# Patient Record
Sex: FEMALE | Race: BLACK OR AFRICAN AMERICAN | NOT HISPANIC OR LATINO | Employment: FULL TIME | URBAN - METROPOLITAN AREA
[De-identification: names, ages, dates, MRNs, and addresses within clinical notes are randomized per-mention and may not be internally consistent; named-entity substitution may affect disease eponyms.]

---

## 2021-10-06 LAB
ABO (EXTERNAL): NORMAL
HEMOGLOBIN (EXTERNAL): 13.4 G/DL (ref 11–16)
HEPATITIS B SURFACE ANTIGEN (EXTERNAL): NEGATIVE
HIV1+2 AB SERPL QL IA: NEGATIVE
PLATELET COUNT (EXTERNAL): 366 10E3/UL (ref 150–400)
RH (EXTERNAL): POSITIVE
RUBELLA ANTIBODY IGG (EXTERNAL): NORMAL
VDRL (SYPHILIS) (EXTERNAL): NONREACTIVE

## 2022-01-27 LAB
HEMOGLOBIN (EXTERNAL): 12.5 G/DL (ref 11–15)
PLATELET COUNT (EXTERNAL): 332 10E3/UL (ref 150–450)

## 2022-03-28 LAB — GROUP B STREPTOCOCCUS (EXTERNAL): NEGATIVE

## 2022-04-04 ENCOUNTER — HOSPITAL ENCOUNTER (OUTPATIENT)
Facility: CLINIC | Age: 27
Discharge: HOME | End: 2022-04-04
Attending: OBSTETRICS & GYNECOLOGY | Admitting: OBSTETRICS & GYNECOLOGY
Payer: COMMERCIAL

## 2022-04-04 VITALS
SYSTOLIC BLOOD PRESSURE: 139 MMHG | HEART RATE: 90 BPM | DIASTOLIC BLOOD PRESSURE: 87 MMHG | TEMPERATURE: 98.1 F | RESPIRATION RATE: 16 BRPM | OXYGEN SATURATION: 100 %

## 2022-04-04 PROBLEM — Z36.89 ENCOUNTER FOR TRIAGE IN PREGNANT PATIENT: Status: ACTIVE | Noted: 2022-04-04

## 2022-04-04 LAB
ABO/RH(D): NORMAL
ALBUMIN MFR UR ELPH: <7 MG/DL
ALT SERPL W P-5'-P-CCNC: <9 U/L (ref 0–45)
ANTIBODY SCREEN: NEGATIVE
AST SERPL W P-5'-P-CCNC: 11 U/L (ref 0–40)
CREAT SERPL-MCNC: 0.57 MG/DL (ref 0.6–1.1)
CREAT UR-MCNC: 42 MG/DL
ERYTHROCYTE [DISTWIDTH] IN BLOOD BY AUTOMATED COUNT: 14 % (ref 10–15)
GFR SERPL CREATININE-BSD FRML MDRD: >90 ML/MIN/1.73M2
HCT VFR BLD AUTO: 34.8 % (ref 35–47)
HGB BLD-MCNC: 11.4 G/DL (ref 11.7–15.7)
MCH RBC QN AUTO: 28.1 PG (ref 26.5–33)
MCHC RBC AUTO-ENTMCNC: 32.8 G/DL (ref 31.5–36.5)
MCV RBC AUTO: 86 FL (ref 78–100)
PLATELET # BLD AUTO: 323 10E3/UL (ref 150–450)
PROT/CREAT 24H UR: NORMAL MG/G{CREAT}
RBC # BLD AUTO: 4.05 10E6/UL (ref 3.8–5.2)
SPECIMEN EXPIRATION DATE: NORMAL
WBC # BLD AUTO: 6.5 10E3/UL (ref 4–11)

## 2022-04-04 PROCEDURE — G0463 HOSPITAL OUTPT CLINIC VISIT: HCPCS

## 2022-04-04 PROCEDURE — 84450 TRANSFERASE (AST) (SGOT): CPT | Performed by: OBSTETRICS & GYNECOLOGY

## 2022-04-04 PROCEDURE — 85027 COMPLETE CBC AUTOMATED: CPT | Performed by: OBSTETRICS & GYNECOLOGY

## 2022-04-04 PROCEDURE — 84460 ALANINE AMINO (ALT) (SGPT): CPT | Performed by: OBSTETRICS & GYNECOLOGY

## 2022-04-04 PROCEDURE — 84156 ASSAY OF PROTEIN URINE: CPT | Performed by: OBSTETRICS & GYNECOLOGY

## 2022-04-04 PROCEDURE — 86901 BLOOD TYPING SEROLOGIC RH(D): CPT | Performed by: OBSTETRICS & GYNECOLOGY

## 2022-04-04 PROCEDURE — 82565 ASSAY OF CREATININE: CPT | Performed by: OBSTETRICS & GYNECOLOGY

## 2022-04-04 PROCEDURE — 36415 COLL VENOUS BLD VENIPUNCTURE: CPT | Performed by: OBSTETRICS & GYNECOLOGY

## 2022-04-04 RX ORDER — OMEPRAZOLE 10 MG/1
20 CAPSULE, DELAYED RELEASE ORAL DAILY PRN
COMMUNITY

## 2022-04-04 RX ORDER — MAGNESIUM SULFATE 4 G/50ML
4 INJECTION INTRAVENOUS
Status: DISCONTINUED | OUTPATIENT
Start: 2022-04-04 | End: 2022-04-04 | Stop reason: HOSPADM

## 2022-04-04 RX ORDER — MAGNESIUM SULFATE HEPTAHYDRATE 40 MG/ML
2 INJECTION, SOLUTION INTRAVENOUS
Status: DISCONTINUED | OUTPATIENT
Start: 2022-04-04 | End: 2022-04-04 | Stop reason: HOSPADM

## 2022-04-04 RX ORDER — SODIUM CHLORIDE, SODIUM LACTATE, POTASSIUM CHLORIDE, CALCIUM CHLORIDE 600; 310; 30; 20 MG/100ML; MG/100ML; MG/100ML; MG/100ML
10-125 INJECTION, SOLUTION INTRAVENOUS CONTINUOUS
Status: DISCONTINUED | OUTPATIENT
Start: 2022-04-04 | End: 2022-04-04 | Stop reason: HOSPADM

## 2022-04-04 RX ORDER — ONDANSETRON 4 MG/1
4 TABLET, FILM COATED ORAL EVERY 8 HOURS PRN
COMMUNITY

## 2022-04-04 RX ORDER — LABETALOL HYDROCHLORIDE 5 MG/ML
20-80 INJECTION, SOLUTION INTRAVENOUS EVERY 10 MIN PRN
Status: DISCONTINUED | OUTPATIENT
Start: 2022-04-04 | End: 2022-04-04 | Stop reason: HOSPADM

## 2022-04-04 RX ORDER — MAGNESIUM SULFATE HEPTAHYDRATE 500 MG/ML
10 INJECTION, SOLUTION INTRAMUSCULAR; INTRAVENOUS
Status: DISCONTINUED | OUTPATIENT
Start: 2022-04-04 | End: 2022-04-04 | Stop reason: HOSPADM

## 2022-04-04 RX ORDER — PRENATAL VIT/IRON FUM/FOLIC AC 27MG-0.8MG
1 TABLET ORAL DAILY
COMMUNITY

## 2022-04-04 RX ORDER — LORAZEPAM 2 MG/ML
2 INJECTION INTRAMUSCULAR
Status: DISCONTINUED | OUTPATIENT
Start: 2022-04-04 | End: 2022-04-04 | Stop reason: HOSPADM

## 2022-04-04 RX ORDER — LIDOCAINE 40 MG/G
CREAM TOPICAL
Status: DISCONTINUED | OUTPATIENT
Start: 2022-04-04 | End: 2022-04-04 | Stop reason: HOSPADM

## 2022-04-04 RX ORDER — ERGOCALCIFEROL (VITAMIN D2) 10 MCG
TABLET ORAL
COMMUNITY

## 2022-04-04 RX ORDER — HYDRALAZINE HYDROCHLORIDE 20 MG/ML
10 INJECTION INTRAMUSCULAR; INTRAVENOUS
Status: DISCONTINUED | OUTPATIENT
Start: 2022-04-04 | End: 2022-04-04 | Stop reason: HOSPADM

## 2022-04-04 NOTE — PLAN OF CARE
Category I tracing. Mild contractions picked up by TOCO, pt states she doesn't feel them. HELLP labs WNL. Pt to be discharged home per provider's orders. Pt instructed to follow up in clinic on Wednesday or Thursday this week. Educated on signs/sxs on pre-eclampsia.

## 2022-04-04 NOTE — DISCHARGE INSTRUCTIONS
Understanding Preeclampsia  Preeclampsia is high blood pressure (hypertension) that happens during pregnancy. It often shows up around the 20th week of pregnancy. It often goes back to normal by the 12th week after you give birth. It can lead to serious health risks for you and your baby. During your pregnancy, your healthcare provider will watch your blood pressure.      Your blood pressure will be monitored regularly throughout your pregnancy to help check for preeclampsia.   Symptoms  A common symptom of preeclampsia is high blood pressure. Other symptoms may include:     Rapid weight gain    Protein in your urine    Headache    Belly (abdominal) pain on your right side    Vision problems. These include flashes or spots.    Swelling (edema) in your face or hands. This also often happens near the end of normal pregnancies, even without preeclampsia.  Tests you may have  Your healthcare provider will want to check your blood pressure throughout your pregnancy. If your blood pressure is high, you may have the following tests:     Urine tests to look for protein    Blood tests to confirm preeclampsia    Fetal monitoring to make sure that your baby is healthy  Treating preeclampsia  You may need to take a daily low dose of aspirin if you are at risk for preeclampsia. Preeclampsia almost always ends soon after you give birth. Until then, your healthcare provider can help manage your condition. If your symptoms are mild, you may need activity limits at home, including bed rest and no heavy lifting. If your symptoms are severe, you will stay in the hospital. Hospital treatment includes:     Activity limits to help control blood pressure. This means no heavy lifting and 8 hours per day lying down with the feet up.    Magnesium IV (intravenous) drip during labor to prevent seizures    Induced labor or surgical delivery by  section. Delivery is considered the cure for preeclampsia.  When to call your healthcare  provider  Call your healthcare provider if swelling, weight gain, or other symptoms come on quickly or are severe. Some cases of preeclampsia are more severe than others. Your symptoms also may change or get worse as you get closer to your due date.   Who s at risk?  No one knows what causes preeclampsia. Preeclampsia can happen in any pregnant woman. But it is more common in first-time pregnancies. Things that increase the risk include:     Previous pregnancies. You are at risk if you had preeclampsia, intrauterine growth retardation (IUGR),  birth, placental abruption, or fetal death in a past pregnancy.    Health history of mother. You are at risk if you have diabetes, high blood pressure, obesity, kidney disease, autoimmune disease such as lupus, or a family history of preeclampsia.    Current pregnancy. You are at risk if this is your first pregnancy, or if you have multiple fetuses, are younger than age 18 or older than 40, or used in vitro  fertilization.    Race. You are at risk if you are black.  Dangers of preeclampsia  If not treated, preeclampsia can cause problems for you and your baby. The placenta is the organ that nourishes your baby. It may tear away from the uterine wall. This can put the baby at risk for health problems (fetal distress) and premature birth. Preeclampsia can also cause these health problems:     Kidney failure or other organ damage    Seizures    Stroke  Once you give birth  In most cases, preeclampsia goes away on its own soon after you give birth. This is often by the 12th week after you deliver. Within days of delivery, your blood pressure, swelling, and other symptoms should get better. For some women, problems from preeclampsia can continue after delivery.   Postpartum preeclampsia that develops within the first 48 hours after delivery is rare. Another type of postpartum preeclampsia that develops more than 48 hours after delivery is called late-onset preeclampsia. It  is also rare. Contact your healthcare provider right away if you have symptoms of preeclampsia after you deliver.   [employer] last reviewed this educational content on 12/1/2019 2000-2021 [employer]. All rights reserved. This information is not intended as a substitute for professional medical care. Always follow your healthcare professional's instructions.

## 2022-04-04 NOTE — PLAN OF CARE
Pt ambulated to room 266 for triage. Sent in from clinic with elevated blood pressures. MD notified of patients arrival.

## 2022-04-10 ENCOUNTER — HOSPITAL ENCOUNTER (OUTPATIENT)
Facility: CLINIC | Age: 27
Discharge: HOME | End: 2022-04-10
Attending: OBSTETRICS & GYNECOLOGY | Admitting: OBSTETRICS & GYNECOLOGY
Payer: COMMERCIAL

## 2022-04-10 VITALS — SYSTOLIC BLOOD PRESSURE: 141 MMHG | DIASTOLIC BLOOD PRESSURE: 90 MMHG

## 2022-04-10 LAB
ABO/RH(D): NORMAL
ALBUMIN MFR UR ELPH: <7 MG/DL
ALT SERPL W P-5'-P-CCNC: <9 U/L (ref 0–45)
ANTIBODY SCREEN: NEGATIVE
AST SERPL W P-5'-P-CCNC: 16 U/L (ref 0–40)
CREAT UR-MCNC: 10 MG/DL
ERYTHROCYTE [DISTWIDTH] IN BLOOD BY AUTOMATED COUNT: 14 % (ref 10–15)
HCT VFR BLD AUTO: 32.6 % (ref 35–47)
HGB BLD-MCNC: 10.9 G/DL (ref 11.7–15.7)
MCH RBC QN AUTO: 27.6 PG (ref 26.5–33)
MCHC RBC AUTO-ENTMCNC: 33.4 G/DL (ref 31.5–36.5)
MCV RBC AUTO: 83 FL (ref 78–100)
PLATELET # BLD AUTO: 326 10E3/UL (ref 150–450)
PROT/CREAT 24H UR: NORMAL MG/G{CREAT}
RBC # BLD AUTO: 3.95 10E6/UL (ref 3.8–5.2)
SPECIMEN EXPIRATION DATE: NORMAL
WBC # BLD AUTO: 8.7 10E3/UL (ref 4–11)

## 2022-04-10 PROCEDURE — 84156 ASSAY OF PROTEIN URINE: CPT | Performed by: OBSTETRICS & GYNECOLOGY

## 2022-04-10 PROCEDURE — 84460 ALANINE AMINO (ALT) (SGPT): CPT | Performed by: OBSTETRICS & GYNECOLOGY

## 2022-04-10 PROCEDURE — 84450 TRANSFERASE (AST) (SGOT): CPT | Performed by: OBSTETRICS & GYNECOLOGY

## 2022-04-10 PROCEDURE — 85027 COMPLETE CBC AUTOMATED: CPT | Performed by: OBSTETRICS & GYNECOLOGY

## 2022-04-10 PROCEDURE — G0463 HOSPITAL OUTPT CLINIC VISIT: HCPCS

## 2022-04-10 PROCEDURE — 36415 COLL VENOUS BLD VENIPUNCTURE: CPT | Performed by: OBSTETRICS & GYNECOLOGY

## 2022-04-10 PROCEDURE — 86901 BLOOD TYPING SEROLOGIC RH(D): CPT | Performed by: OBSTETRICS & GYNECOLOGY

## 2022-04-10 RX ORDER — LIDOCAINE 40 MG/G
CREAM TOPICAL
Status: DISCONTINUED | OUTPATIENT
Start: 2022-04-10 | End: 2022-04-10 | Stop reason: HOSPADM

## 2022-04-10 NOTE — PROGRESS NOTES
Outpatient OB assessment.   The chart, vital signs and ab results were reviewed.   Patient was seen to r/o Pre-eclampsia due to a complaint of a headache. Her labs are normal. Seven does have a right sided toothache with radiation of the pain to the right temple. She has not been able to rest well.   She was given the contact information to an emergency dental clinic in [place] to be assessed today.   Her NST was reactive. She is not in active labor.  She will continue her routine obstetrical care.   Discharge from triage.

## 2022-04-10 NOTE — PROGRESS NOTES
Dr Cheek at bedside. Pt instructed to go to an emergency dental clinic today to tooth/pain assessed. Pt agreeable to plan.

## 2022-04-10 NOTE — DISCHARGE INSTRUCTIONS
Make same day appointment with dental clinic.    Keep follow up appointments as scheduled in clinic.     Call clinic with questions or concerns.

## 2022-04-10 NOTE — PROGRESS NOTES
Pt to [facility] with complaints of intense tooth pain and a headache. Pt states the right side of her mouth hurts from what she believes is an infected tooth. Pt states she also has a chronic headache on the right temporal area. Pt denies any visual changes, RUQ pain, nausea/vomitting. Plan to draw HELLP labs and serial blood pressures.

## 2022-04-14 ENCOUNTER — HOSPITAL ENCOUNTER (INPATIENT)
Facility: CLINIC | Age: 27
LOS: 2 days | Discharge: HOME | End: 2022-04-16
Attending: OBSTETRICS & GYNECOLOGY | Admitting: OBSTETRICS & GYNECOLOGY
Payer: COMMERCIAL

## 2022-04-14 DIAGNOSIS — O13.3 GESTATIONAL HYPERTENSION, THIRD TRIMESTER: Primary | ICD-10-CM

## 2022-04-14 PROBLEM — Z33.1 PREGNANCY AS INCIDENTAL FINDING: Status: ACTIVE | Noted: 2022-04-14

## 2022-04-14 LAB
ABO/RH(D): NORMAL
ALBUMIN MFR UR ELPH: <7 MG/DL
ALT SERPL W P-5'-P-CCNC: <9 U/L (ref 0–45)
ANTIBODY SCREEN: NEGATIVE
APTT PPP: 26 SECONDS (ref 22–38)
AST SERPL W P-5'-P-CCNC: 10 U/L (ref 0–40)
BASOPHILS # BLD AUTO: 0 10E3/UL (ref 0–0.2)
BASOPHILS NFR BLD AUTO: 1 %
CREAT SERPL-MCNC: 0.67 MG/DL (ref 0.6–1.1)
CREAT UR-MCNC: 50 MG/DL
EOSINOPHIL # BLD AUTO: 0.2 10E3/UL (ref 0–0.7)
EOSINOPHIL NFR BLD AUTO: 3 %
ERYTHROCYTE [DISTWIDTH] IN BLOOD BY AUTOMATED COUNT: 14.4 % (ref 10–15)
ERYTHROCYTE [DISTWIDTH] IN BLOOD BY AUTOMATED COUNT: 14.5 % (ref 10–15)
GFR SERPL CREATININE-BSD FRML MDRD: >90 ML/MIN/1.73M2
HCT VFR BLD AUTO: 32.8 % (ref 35–47)
HCT VFR BLD AUTO: 33 % (ref 35–47)
HGB BLD-MCNC: 10.9 G/DL (ref 11.7–15.7)
HGB BLD-MCNC: 10.9 G/DL (ref 11.7–15.7)
IMM GRANULOCYTES # BLD: 0 10E3/UL
IMM GRANULOCYTES NFR BLD: 1 %
INR PPP: 0.99 (ref 0.85–1.15)
LYMPHOCYTES # BLD AUTO: 1.6 10E3/UL (ref 0.8–5.3)
LYMPHOCYTES NFR BLD AUTO: 25 %
MCH RBC QN AUTO: 27.6 PG (ref 26.5–33)
MCH RBC QN AUTO: 27.6 PG (ref 26.5–33)
MCHC RBC AUTO-ENTMCNC: 33 G/DL (ref 31.5–36.5)
MCHC RBC AUTO-ENTMCNC: 33.2 G/DL (ref 31.5–36.5)
MCV RBC AUTO: 83 FL (ref 78–100)
MCV RBC AUTO: 84 FL (ref 78–100)
MONOCYTES # BLD AUTO: 0.5 10E3/UL (ref 0–1.3)
MONOCYTES NFR BLD AUTO: 7 %
NEUTROPHILS # BLD AUTO: 4.1 10E3/UL (ref 1.6–8.3)
NEUTROPHILS NFR BLD AUTO: 63 %
NRBC # BLD AUTO: 0 10E3/UL
NRBC BLD AUTO-RTO: 0 /100
PLATELET # BLD AUTO: 311 10E3/UL (ref 150–450)
PLATELET # BLD AUTO: 323 10E3/UL (ref 150–450)
PROT/CREAT 24H UR: NORMAL MG/G{CREAT}
RBC # BLD AUTO: 3.95 10E6/UL (ref 3.8–5.2)
RBC # BLD AUTO: 3.95 10E6/UL (ref 3.8–5.2)
SARS-COV-2 RNA RESP QL NAA+PROBE: NEGATIVE
SPECIMEN EXPIRATION DATE: NORMAL
WBC # BLD AUTO: 6.4 10E3/UL (ref 4–11)
WBC # BLD AUTO: 7.4 10E3/UL (ref 4–11)

## 2022-04-14 PROCEDURE — 86780 TREPONEMA PALLIDUM: CPT | Performed by: OBSTETRICS & GYNECOLOGY

## 2022-04-14 PROCEDURE — 85610 PROTHROMBIN TIME: CPT | Performed by: OBSTETRICS & GYNECOLOGY

## 2022-04-14 PROCEDURE — 85027 COMPLETE CBC AUTOMATED: CPT | Performed by: OBSTETRICS & GYNECOLOGY

## 2022-04-14 PROCEDURE — 84460 ALANINE AMINO (ALT) (SGPT): CPT | Performed by: OBSTETRICS & GYNECOLOGY

## 2022-04-14 PROCEDURE — 250N000013 HC RX MED GY IP 250 OP 250 PS 637: Performed by: OBSTETRICS & GYNECOLOGY

## 2022-04-14 PROCEDURE — 36415 COLL VENOUS BLD VENIPUNCTURE: CPT | Performed by: OBSTETRICS & GYNECOLOGY

## 2022-04-14 PROCEDURE — 86850 RBC ANTIBODY SCREEN: CPT | Performed by: OBSTETRICS & GYNECOLOGY

## 2022-04-14 PROCEDURE — 86901 BLOOD TYPING SEROLOGIC RH(D): CPT | Performed by: OBSTETRICS & GYNECOLOGY

## 2022-04-14 PROCEDURE — 84156 ASSAY OF PROTEIN URINE: CPT | Performed by: OBSTETRICS & GYNECOLOGY

## 2022-04-14 PROCEDURE — 85730 THROMBOPLASTIN TIME PARTIAL: CPT | Performed by: OBSTETRICS & GYNECOLOGY

## 2022-04-14 PROCEDURE — 87635 SARS-COV-2 COVID-19 AMP PRB: CPT | Performed by: OBSTETRICS & GYNECOLOGY

## 2022-04-14 PROCEDURE — 3E0P7VZ INTRODUCTION OF HORMONE INTO FEMALE REPRODUCTIVE, VIA NATURAL OR ARTIFICIAL OPENING: ICD-10-PCS | Performed by: OBSTETRICS & GYNECOLOGY

## 2022-04-14 PROCEDURE — 120N000001 HC R&B MED SURG/OB

## 2022-04-14 PROCEDURE — 85004 AUTOMATED DIFF WBC COUNT: CPT | Performed by: OBSTETRICS & GYNECOLOGY

## 2022-04-14 PROCEDURE — 84450 TRANSFERASE (AST) (SGOT): CPT | Performed by: OBSTETRICS & GYNECOLOGY

## 2022-04-14 PROCEDURE — 82565 ASSAY OF CREATININE: CPT | Performed by: OBSTETRICS & GYNECOLOGY

## 2022-04-14 RX ORDER — LORAZEPAM 2 MG/ML
2 INJECTION INTRAMUSCULAR
Status: DISCONTINUED | OUTPATIENT
Start: 2022-04-14 | End: 2022-04-16 | Stop reason: HOSPADM

## 2022-04-14 RX ORDER — METOCLOPRAMIDE HYDROCHLORIDE 5 MG/ML
10 INJECTION INTRAMUSCULAR; INTRAVENOUS EVERY 6 HOURS PRN
Status: DISCONTINUED | OUTPATIENT
Start: 2022-04-14 | End: 2022-04-16 | Stop reason: HOSPADM

## 2022-04-14 RX ORDER — CALCIUM GLUCONATE 94 MG/ML
1 INJECTION, SOLUTION INTRAVENOUS
Status: DISCONTINUED | OUTPATIENT
Start: 2022-04-14 | End: 2022-04-16 | Stop reason: HOSPADM

## 2022-04-14 RX ORDER — MISOPROSTOL 200 UG/1
400 TABLET ORAL
Status: DISCONTINUED | OUTPATIENT
Start: 2022-04-14 | End: 2022-04-16 | Stop reason: HOSPADM

## 2022-04-14 RX ORDER — MISOPROSTOL 200 UG/1
800 TABLET ORAL
Status: DISCONTINUED | OUTPATIENT
Start: 2022-04-14 | End: 2022-04-16 | Stop reason: HOSPADM

## 2022-04-14 RX ORDER — MAGNESIUM SULFATE HEPTAHYDRATE 500 MG/ML
10 INJECTION, SOLUTION INTRAMUSCULAR; INTRAVENOUS
Status: DISCONTINUED | OUTPATIENT
Start: 2022-04-14 | End: 2022-04-16 | Stop reason: HOSPADM

## 2022-04-14 RX ORDER — HYDROXYZINE HYDROCHLORIDE 25 MG/1
50 TABLET, FILM COATED ORAL
Status: DISCONTINUED | OUTPATIENT
Start: 2022-04-14 | End: 2022-04-15

## 2022-04-14 RX ORDER — OXYTOCIN 10 [USP'U]/ML
10 INJECTION, SOLUTION INTRAMUSCULAR; INTRAVENOUS
Status: DISCONTINUED | OUTPATIENT
Start: 2022-04-14 | End: 2022-04-16 | Stop reason: HOSPADM

## 2022-04-14 RX ORDER — METHYLERGONOVINE MALEATE 0.2 MG/ML
200 INJECTION INTRAVENOUS
Status: DISCONTINUED | OUTPATIENT
Start: 2022-04-14 | End: 2022-04-16 | Stop reason: HOSPADM

## 2022-04-14 RX ORDER — MAGNESIUM SULFATE 4 G/50ML
4 INJECTION INTRAVENOUS ONCE
Status: DISCONTINUED | OUTPATIENT
Start: 2022-04-14 | End: 2022-04-16 | Stop reason: HOSPADM

## 2022-04-14 RX ORDER — METOCLOPRAMIDE 10 MG/1
10 TABLET ORAL EVERY 6 HOURS PRN
Status: DISCONTINUED | OUTPATIENT
Start: 2022-04-14 | End: 2022-04-16 | Stop reason: HOSPADM

## 2022-04-14 RX ORDER — HYDRALAZINE HYDROCHLORIDE 20 MG/ML
10 INJECTION INTRAMUSCULAR; INTRAVENOUS
Status: DISCONTINUED | OUTPATIENT
Start: 2022-04-14 | End: 2022-04-16 | Stop reason: HOSPADM

## 2022-04-14 RX ORDER — NALOXONE HYDROCHLORIDE 0.4 MG/ML
0.4 INJECTION, SOLUTION INTRAMUSCULAR; INTRAVENOUS; SUBCUTANEOUS
Status: DISCONTINUED | OUTPATIENT
Start: 2022-04-14 | End: 2022-04-16 | Stop reason: HOSPADM

## 2022-04-14 RX ORDER — OXYTOCIN/0.9 % SODIUM CHLORIDE 30/500 ML
340 PLASTIC BAG, INJECTION (ML) INTRAVENOUS CONTINUOUS PRN
Status: DISCONTINUED | OUTPATIENT
Start: 2022-04-14 | End: 2022-04-16 | Stop reason: HOSPADM

## 2022-04-14 RX ORDER — IBUPROFEN 800 MG/1
800 TABLET, FILM COATED ORAL
Status: DISCONTINUED | OUTPATIENT
Start: 2022-04-14 | End: 2022-04-16 | Stop reason: HOSPADM

## 2022-04-14 RX ORDER — MAGNESIUM SULFATE HEPTAHYDRATE 40 MG/ML
2 INJECTION, SOLUTION INTRAVENOUS
Status: DISCONTINUED | OUTPATIENT
Start: 2022-04-14 | End: 2022-04-16 | Stop reason: HOSPADM

## 2022-04-14 RX ORDER — CARBOPROST TROMETHAMINE 250 UG/ML
250 INJECTION, SOLUTION INTRAMUSCULAR
Status: DISCONTINUED | OUTPATIENT
Start: 2022-04-14 | End: 2022-04-16 | Stop reason: HOSPADM

## 2022-04-14 RX ORDER — KETOROLAC TROMETHAMINE 30 MG/ML
30 INJECTION, SOLUTION INTRAMUSCULAR; INTRAVENOUS
Status: DISCONTINUED | OUTPATIENT
Start: 2022-04-14 | End: 2022-04-16 | Stop reason: HOSPADM

## 2022-04-14 RX ORDER — LIDOCAINE 40 MG/G
CREAM TOPICAL
Status: DISCONTINUED | OUTPATIENT
Start: 2022-04-14 | End: 2022-04-14 | Stop reason: HOSPADM

## 2022-04-14 RX ORDER — LABETALOL HYDROCHLORIDE 5 MG/ML
20-80 INJECTION, SOLUTION INTRAVENOUS EVERY 10 MIN PRN
Status: DISCONTINUED | OUTPATIENT
Start: 2022-04-14 | End: 2022-04-16 | Stop reason: HOSPADM

## 2022-04-14 RX ORDER — NALOXONE HYDROCHLORIDE 0.4 MG/ML
0.2 INJECTION, SOLUTION INTRAMUSCULAR; INTRAVENOUS; SUBCUTANEOUS
Status: DISCONTINUED | OUTPATIENT
Start: 2022-04-14 | End: 2022-04-16 | Stop reason: HOSPADM

## 2022-04-14 RX ORDER — PROCHLORPERAZINE 25 MG
25 SUPPOSITORY, RECTAL RECTAL EVERY 12 HOURS PRN
Status: DISCONTINUED | OUTPATIENT
Start: 2022-04-14 | End: 2022-04-16 | Stop reason: HOSPADM

## 2022-04-14 RX ORDER — MAGNESIUM SULFATE IN WATER 40 MG/ML
2 INJECTION, SOLUTION INTRAVENOUS CONTINUOUS
Status: DISCONTINUED | OUTPATIENT
Start: 2022-04-14 | End: 2022-04-16 | Stop reason: HOSPADM

## 2022-04-14 RX ORDER — MAGNESIUM SULFATE 4 G/50ML
4 INJECTION INTRAVENOUS
Status: DISCONTINUED | OUTPATIENT
Start: 2022-04-14 | End: 2022-04-16 | Stop reason: HOSPADM

## 2022-04-14 RX ORDER — SODIUM CHLORIDE, SODIUM LACTATE, POTASSIUM CHLORIDE, CALCIUM CHLORIDE 600; 310; 30; 20 MG/100ML; MG/100ML; MG/100ML; MG/100ML
10-125 INJECTION, SOLUTION INTRAVENOUS CONTINUOUS
Status: DISCONTINUED | OUTPATIENT
Start: 2022-04-14 | End: 2022-04-16 | Stop reason: HOSPADM

## 2022-04-14 RX ORDER — PROCHLORPERAZINE MALEATE 10 MG
10 TABLET ORAL EVERY 6 HOURS PRN
Status: DISCONTINUED | OUTPATIENT
Start: 2022-04-14 | End: 2022-04-16 | Stop reason: HOSPADM

## 2022-04-14 RX ORDER — CITRIC ACID/SODIUM CITRATE 334-500MG
30 SOLUTION, ORAL ORAL
Status: DISCONTINUED | OUTPATIENT
Start: 2022-04-14 | End: 2022-04-16 | Stop reason: HOSPADM

## 2022-04-14 RX ORDER — ONDANSETRON 2 MG/ML
4 INJECTION INTRAMUSCULAR; INTRAVENOUS EVERY 6 HOURS PRN
Status: DISCONTINUED | OUTPATIENT
Start: 2022-04-14 | End: 2022-04-16 | Stop reason: HOSPADM

## 2022-04-14 RX ORDER — ONDANSETRON 4 MG/1
4 TABLET, ORALLY DISINTEGRATING ORAL EVERY 6 HOURS PRN
Status: DISCONTINUED | OUTPATIENT
Start: 2022-04-14 | End: 2022-04-16 | Stop reason: HOSPADM

## 2022-04-14 RX ORDER — OXYTOCIN/0.9 % SODIUM CHLORIDE 30/500 ML
100-340 PLASTIC BAG, INJECTION (ML) INTRAVENOUS CONTINUOUS PRN
Status: DISCONTINUED | OUTPATIENT
Start: 2022-04-14 | End: 2022-04-16 | Stop reason: HOSPADM

## 2022-04-14 RX ADMIN — DINOPROSTONE 10 MG: 10 INSERT VAGINAL at 20:56

## 2022-04-14 RX ADMIN — HYDROXYZINE HYDROCHLORIDE 50 MG: 25 TABLET, FILM COATED ORAL at 23:02

## 2022-04-14 ASSESSMENT — ACTIVITIES OF DAILY LIVING (ADL)
DOING_ERRANDS_INDEPENDENTLY_DIFFICULTY: NO
DIFFICULTY_COMMUNICATING: NO
DRESSING/BATHING_DIFFICULTY: NO
WEAR_GLASSES_OR_BLIND: NO
WALKING_OR_CLIMBING_STAIRS_DIFFICULTY: NO
CHANGE_IN_FUNCTIONAL_STATUS_SINCE_ONSET_OF_CURRENT_ILLNESS/INJURY: NO
DIFFICULTY_EATING/SWALLOWING: NO
CONCENTRATING,_REMEMBERING_OR_MAKING_DECISIONS_DIFFICULTY: NO
HEARING_DIFFICULTY_OR_DEAF: NO
TOILETING_ISSUES: NO
FALL_HISTORY_WITHIN_LAST_SIX_MONTHS: NO

## 2022-04-14 NOTE — PLAN OF CARE
Dr Cheek here on unit, and rounded on pt to discuss admission related to most recent labs, VS, and physical assessment. Pt and  are in agreement with POC for admission and cervical ripening.

## 2022-04-14 NOTE — H&P
OB ADMISSION NOTE    CHIEF COMPLAINT: elevated blood pressure    OBSTETRICAL / DATING HISTORY:  Estimated Date of Delivery: 2022   Gestational Age: 39w0d    OB History    Para Term  AB Living   1 0 0 0 0 0   SAB IAB Ectopic Multiple Live Births   0 0 0 0 0      # Outcome Date GA Lbr Chay/2nd Weight Sex Delivery Anes PTL Lv   1 Current                  PAST MEDICAL HISTORY:  No past medical history on file.    PAST SURGICAL HISTORY:  No past surgical history on file.     FAMILY HISTORY:  No family history on file.    ALLERGIES:  No Known Allergies      HISTORY OF PRESENT ILLNESS:    (Please see scanned  sheets for prenatal history and prenatal labwork. Examination at the time of admission revealed no interval change in the patient s history or physical exam except as described below.)    Seven has had labile Bps for over a week now. Today her readings were in the sever range.  She denies signs and symptoms of pre-eclampsia. We discussed admission ad cervical ripening.     REVIEW OF SYSTEMS:  See HPI, otherwise the review of systems is unremarkable.     PHYSICAL EXAM:   BP (!) 149/97   Temp 98  F (36.7  C) (Oral)   Resp 18   SpO2 100%    HEART: RRR  LUNGS: CTAB  EXT: negative for edema 2+ DTRs, no clonus. RN reports 1 beat of clonus earlier.   Fetal Heart Tones: category 1   CONTRACTIONS:  none  CERVIX: dilated - 0 cm long and posterior.  Membranes: intact  Fetal Presentation: vertex Fetal position confirmed by US earlier today.     Impression:  Term gestation. Pregnancy induced hypertension, severe range 160/100. Patient is in agreement with the plan.     Plan:  Cervical ripening.         Niurka Cheek MD

## 2022-04-14 NOTE — PROVIDER NOTIFICATION
Dr Cheek is on the unit aware of pt's serial blood pressures, and awaiting lab results. MD anticipating admission. Will discuss orders when all labs are resulted, no new orders at this time.

## 2022-04-15 PROBLEM — Z33.1 PREGNANCY AS INCIDENTAL FINDING: Status: RESOLVED | Noted: 2022-04-14 | Resolved: 2022-04-15

## 2022-04-15 PROBLEM — Z36.89 ENCOUNTER FOR TRIAGE IN PREGNANT PATIENT: Status: RESOLVED | Noted: 2022-04-04 | Resolved: 2022-04-15

## 2022-04-15 PROBLEM — O13.3 GESTATIONAL HYPERTENSION, THIRD TRIMESTER: Status: ACTIVE | Noted: 2022-04-15

## 2022-04-15 LAB
ALT SERPL W P-5'-P-CCNC: <9 U/L (ref 0–45)
AST SERPL W P-5'-P-CCNC: 9 U/L (ref 0–40)
CREAT SERPL-MCNC: 0.6 MG/DL (ref 0.6–1.1)
CREAT SERPL-MCNC: 0.63 MG/DL (ref 0.6–1.1)
CREAT SERPL-MCNC: 0.65 MG/DL (ref 0.6–1.1)
ERYTHROCYTE [DISTWIDTH] IN BLOOD BY AUTOMATED COUNT: 14.6 % (ref 10–15)
ERYTHROCYTE [DISTWIDTH] IN BLOOD BY AUTOMATED COUNT: 14.6 % (ref 10–15)
ERYTHROCYTE [DISTWIDTH] IN BLOOD BY AUTOMATED COUNT: 14.7 % (ref 10–15)
GFR SERPL CREATININE-BSD FRML MDRD: >90 ML/MIN/1.73M2
HCT VFR BLD AUTO: 31.2 % (ref 35–47)
HCT VFR BLD AUTO: 32.1 % (ref 35–47)
HCT VFR BLD AUTO: 34.8 % (ref 35–47)
HGB BLD-MCNC: 10.6 G/DL (ref 11.7–15.7)
HGB BLD-MCNC: 10.8 G/DL (ref 11.7–15.7)
HGB BLD-MCNC: 11.3 G/DL (ref 11.7–15.7)
MCH RBC QN AUTO: 27.9 PG (ref 26.5–33)
MCH RBC QN AUTO: 27.9 PG (ref 26.5–33)
MCH RBC QN AUTO: 28 PG (ref 26.5–33)
MCHC RBC AUTO-ENTMCNC: 32.5 G/DL (ref 31.5–36.5)
MCHC RBC AUTO-ENTMCNC: 33.6 G/DL (ref 31.5–36.5)
MCHC RBC AUTO-ENTMCNC: 34 G/DL (ref 31.5–36.5)
MCV RBC AUTO: 82 FL (ref 78–100)
MCV RBC AUTO: 83 FL (ref 78–100)
MCV RBC AUTO: 86 FL (ref 78–100)
PLATELET # BLD AUTO: 292 10E3/UL (ref 150–450)
PLATELET # BLD AUTO: 308 10E3/UL (ref 150–450)
PLATELET # BLD AUTO: 321 10E3/UL (ref 150–450)
RBC # BLD AUTO: 3.8 10E6/UL (ref 3.8–5.2)
RBC # BLD AUTO: 3.87 10E6/UL (ref 3.8–5.2)
RBC # BLD AUTO: 4.04 10E6/UL (ref 3.8–5.2)
T PALLIDUM AB SER QL: NONREACTIVE
WBC # BLD AUTO: 6.7 10E3/UL (ref 4–11)
WBC # BLD AUTO: 7.2 10E3/UL (ref 4–11)
WBC # BLD AUTO: 8.7 10E3/UL (ref 4–11)

## 2022-04-15 PROCEDURE — 36415 COLL VENOUS BLD VENIPUNCTURE: CPT | Performed by: OBSTETRICS & GYNECOLOGY

## 2022-04-15 PROCEDURE — 250N000013 HC RX MED GY IP 250 OP 250 PS 637: Performed by: OBSTETRICS & GYNECOLOGY

## 2022-04-15 PROCEDURE — 84460 ALANINE AMINO (ALT) (SGPT): CPT | Performed by: OBSTETRICS & GYNECOLOGY

## 2022-04-15 PROCEDURE — 82565 ASSAY OF CREATININE: CPT | Performed by: OBSTETRICS & GYNECOLOGY

## 2022-04-15 PROCEDURE — 120N000001 HC R&B MED SURG/OB

## 2022-04-15 PROCEDURE — 85027 COMPLETE CBC AUTOMATED: CPT | Performed by: OBSTETRICS & GYNECOLOGY

## 2022-04-15 PROCEDURE — 84450 TRANSFERASE (AST) (SGOT): CPT | Performed by: OBSTETRICS & GYNECOLOGY

## 2022-04-15 PROCEDURE — 258N000003 HC RX IP 258 OP 636: Performed by: OBSTETRICS & GYNECOLOGY

## 2022-04-15 RX ORDER — HYDROXYZINE HYDROCHLORIDE 25 MG/1
100 TABLET, FILM COATED ORAL
Status: DISCONTINUED | OUTPATIENT
Start: 2022-04-15 | End: 2022-04-16 | Stop reason: HOSPADM

## 2022-04-15 RX ORDER — MISOPROSTOL 100 UG/1
25 TABLET ORAL
Status: DISCONTINUED | OUTPATIENT
Start: 2022-04-15 | End: 2022-04-16 | Stop reason: HOSPADM

## 2022-04-15 RX ORDER — TERBUTALINE SULFATE 1 MG/ML
0.25 INJECTION, SOLUTION SUBCUTANEOUS
Status: DISCONTINUED | OUTPATIENT
Start: 2022-04-15 | End: 2022-04-16 | Stop reason: HOSPADM

## 2022-04-15 RX ADMIN — Medication 25 MCG: at 17:39

## 2022-04-15 RX ADMIN — SODIUM CHLORIDE, POTASSIUM CHLORIDE, SODIUM LACTATE AND CALCIUM CHLORIDE 500 ML: 600; 310; 30; 20 INJECTION, SOLUTION INTRAVENOUS at 12:30

## 2022-04-15 RX ADMIN — Medication 25 MCG: at 19:45

## 2022-04-15 RX ADMIN — Medication 25 MCG: at 23:48

## 2022-04-15 RX ADMIN — HYDROXYZINE HYDROCHLORIDE 100 MG: 25 TABLET, FILM COATED ORAL at 20:53

## 2022-04-15 RX ADMIN — Medication 25 MCG: at 21:48

## 2022-04-15 NOTE — PROGRESS NOTES
"LABOR PROGRESS NOTE - HD 2    SUBJECTIVE: Feeling some cramping with cervidil - will be due for removal at 9 am.  Active baby, denies ROM or vaginal bleeding.  Denies any symptoms of severe preeclampsia.    OBJECTIVE: /84   Pulse 78   Temp 98.3  F (36.8  C) (Oral)   Resp 16   Ht 1.727 m (5' 8\")   Wt 109.3 kg (241 lb)   SpO2 99%   BMI 36.64 kg/m    GENERAL: Awake, alert, oriented x 3, in no acute distress  ABDOMEN: Soft, gravid  FHT: Reactive, category I  SVE: Deferred  CTX: Irregular    ASSESSMENT:    @ 39.1 weeks   Gestational hypertension  GBS negative  COVID-19 negative    PLAN: Continue with cervical ripening - will remove Cervidil later this morning and reassess need for further ripening.  Plan oral cytotec if unfavorable Mcdonald, Pitocin if Mcdonald>8.  Discussed plan with patient and partner who are in agreement.     Currently BP are in non-severe range, not requiring medication.  Consider magnesium sulfate for seizure prophylaxis if symptomatic or severe range BP.  HELLP panel was WNL on admission.    Overall reassuring maternal and fetal status.      Sammy Palmer MD      "

## 2022-04-15 NOTE — PROGRESS NOTES
Dr. Palmer notified of cervadil removal and SVE at 0930. Per Dr. palmer patient may be off monitor, up ad lidia, and may shower or bathe. Dr. Palmer will enter orders for cytotec when able.

## 2022-04-15 NOTE — PLAN OF CARE
Spoke with Dr Cheek to clarify orders for induction and plan moving forward. T.O. no magnesium infusion at this time, and plan for vaginal cervidil to start induction.

## 2022-04-16 VITALS
HEART RATE: 82 BPM | BODY MASS INDEX: 36.53 KG/M2 | TEMPERATURE: 98.2 F | OXYGEN SATURATION: 98 % | RESPIRATION RATE: 16 BRPM | WEIGHT: 241 LBS | SYSTOLIC BLOOD PRESSURE: 141 MMHG | DIASTOLIC BLOOD PRESSURE: 90 MMHG | HEIGHT: 68 IN

## 2022-04-16 LAB
ALT SERPL W P-5'-P-CCNC: <9 U/L (ref 0–45)
AST SERPL W P-5'-P-CCNC: 12 U/L (ref 0–40)
CREAT SERPL-MCNC: 0.65 MG/DL (ref 0.6–1.1)
ERYTHROCYTE [DISTWIDTH] IN BLOOD BY AUTOMATED COUNT: 14.9 % (ref 10–15)
GFR SERPL CREATININE-BSD FRML MDRD: >90 ML/MIN/1.73M2
HCT VFR BLD AUTO: 34.2 % (ref 35–47)
HGB BLD-MCNC: 11.2 G/DL (ref 11.7–15.7)
MCH RBC QN AUTO: 27.3 PG (ref 26.5–33)
MCHC RBC AUTO-ENTMCNC: 32.7 G/DL (ref 31.5–36.5)
MCV RBC AUTO: 83 FL (ref 78–100)
PLATELET # BLD AUTO: 318 10E3/UL (ref 150–450)
RBC # BLD AUTO: 4.11 10E6/UL (ref 3.8–5.2)
WBC # BLD AUTO: 7.5 10E3/UL (ref 4–11)

## 2022-04-16 PROCEDURE — 82565 ASSAY OF CREATININE: CPT | Performed by: OBSTETRICS & GYNECOLOGY

## 2022-04-16 PROCEDURE — 36415 COLL VENOUS BLD VENIPUNCTURE: CPT | Performed by: OBSTETRICS & GYNECOLOGY

## 2022-04-16 PROCEDURE — 84450 TRANSFERASE (AST) (SGOT): CPT | Performed by: OBSTETRICS & GYNECOLOGY

## 2022-04-16 PROCEDURE — 85014 HEMATOCRIT: CPT | Performed by: OBSTETRICS & GYNECOLOGY

## 2022-04-16 PROCEDURE — 84460 ALANINE AMINO (ALT) (SGPT): CPT | Performed by: OBSTETRICS & GYNECOLOGY

## 2022-04-16 PROCEDURE — 250N000013 HC RX MED GY IP 250 OP 250 PS 637: Performed by: OBSTETRICS & GYNECOLOGY

## 2022-04-16 RX ORDER — LABETALOL 100 MG/1
100 TABLET, FILM COATED ORAL 2 TIMES DAILY
Qty: 30 TABLET | Refills: 0 | Status: ON HOLD | OUTPATIENT
Start: 2022-04-16 | End: 2022-04-29

## 2022-04-16 RX ADMIN — Medication 25 MCG: at 06:33

## 2022-04-16 RX ADMIN — Medication 25 MCG: at 01:49

## 2022-04-16 RX ADMIN — Medication 25 MCG: at 04:29

## 2022-04-16 NOTE — DISCHARGE SUMMARY
"HOSPITAL DISCHARGE SUMMARY - Antepartum    Patient Name: Seven Brown   YOB: 1995  Age: 27 year old  Medical Record Number: [id number]  Primary Physician: No Ref-Primary, Physician      Admission Date:  4/14/2022  Discharge Date:  4/16/2022    Principal Diagnosis:    Problem List Items Addressed This Visit    None         Seven Brown will be discharged from [facility] to home. She feels well and has no concerns. Pain is well controlled with current medications.        REASON FOR ADMISSION: gestational hypertension    PROCEDURES:  none    HISTORY OF PRESENT ILLNESS AND HOSPITAL COURSE: This is a 27 year old female who was admitted with elevated blood pressure readings and a mild headache. Her BP readings have improved with rest and she is currently asymptomatic. She has not responded to cervical ripening. We discussed continued rest at home with oral antihypertensive. She will be seen in the office in 2 days.     Objective:  BP (!) 141/90   Pulse 82   Temp 98.2  F (36.8  C) (Oral)   Resp 16   Ht 1.727 m (5' 8\")   Wt 109.3 kg (241 lb)   SpO2 98%   BMI 36.64 kg/m    Abd:  Soft, nontender, minimally distended, bowel sounds present.  Incision: intact, dry, no redness present.  Ext: without edema bilaterally    Discharge Medications:   labetalol    Discharge Plan:    Follow up with /CNM:  Niurka Cheek MD   Patient Instructions:      Physical activity: ad lidia / rest    Diet:  regular    Medication:  See med rec    RTC:  2 days  .   Niurka Cheek MD      "

## 2022-04-25 ENCOUNTER — HOSPITAL ENCOUNTER (INPATIENT)
Facility: CLINIC | Age: 27
LOS: 4 days | Discharge: HOME HEALTH | End: 2022-04-29
Attending: OBSTETRICS & GYNECOLOGY | Admitting: OBSTETRICS & GYNECOLOGY
Payer: COMMERCIAL

## 2022-04-25 DIAGNOSIS — O13.9 GESTATIONAL HYPERTENSION, ANTEPARTUM: ICD-10-CM

## 2022-04-25 DIAGNOSIS — O13.3 GESTATIONAL HYPERTENSION, THIRD TRIMESTER: ICD-10-CM

## 2022-04-25 LAB
ABO/RH(D): NORMAL
ALBUMIN MFR UR ELPH: 21.6 MG/DL
ALT SERPL W P-5'-P-CCNC: <9 U/L (ref 0–45)
ANTIBODY SCREEN: NEGATIVE
AST SERPL W P-5'-P-CCNC: 11 U/L (ref 0–40)
CREAT SERPL-MCNC: 0.67 MG/DL (ref 0.6–1.1)
CREAT UR-MCNC: 92 MG/DL
ERYTHROCYTE [DISTWIDTH] IN BLOOD BY AUTOMATED COUNT: 14.8 % (ref 10–15)
GFR SERPL CREATININE-BSD FRML MDRD: >90 ML/MIN/1.73M2
HCT VFR BLD AUTO: 32.1 % (ref 35–47)
HGB BLD-MCNC: 10.7 G/DL (ref 11.7–15.7)
HOLD SPECIMEN: NORMAL
MCH RBC QN AUTO: 27.6 PG (ref 26.5–33)
MCHC RBC AUTO-ENTMCNC: 33.3 G/DL (ref 31.5–36.5)
MCV RBC AUTO: 83 FL (ref 78–100)
PLATELET # BLD AUTO: 295 10E3/UL (ref 150–450)
PROT/CREAT 24H UR: 0.23 MG/MG CR
RBC # BLD AUTO: 3.87 10E6/UL (ref 3.8–5.2)
SARS-COV-2 RNA RESP QL NAA+PROBE: NEGATIVE
SPECIMEN EXPIRATION DATE: NORMAL
WBC # BLD AUTO: 6.9 10E3/UL (ref 4–11)

## 2022-04-25 PROCEDURE — 250N000013 HC RX MED GY IP 250 OP 250 PS 637: Performed by: OBSTETRICS & GYNECOLOGY

## 2022-04-25 PROCEDURE — C9803 HOPD COVID-19 SPEC COLLECT: HCPCS

## 2022-04-25 PROCEDURE — 85027 COMPLETE CBC AUTOMATED: CPT | Performed by: OBSTETRICS & GYNECOLOGY

## 2022-04-25 PROCEDURE — 120N000001 HC R&B MED SURG/OB

## 2022-04-25 PROCEDURE — 86901 BLOOD TYPING SEROLOGIC RH(D): CPT | Performed by: OBSTETRICS & GYNECOLOGY

## 2022-04-25 PROCEDURE — 84450 TRANSFERASE (AST) (SGOT): CPT | Performed by: OBSTETRICS & GYNECOLOGY

## 2022-04-25 PROCEDURE — 84460 ALANINE AMINO (ALT) (SGPT): CPT | Performed by: OBSTETRICS & GYNECOLOGY

## 2022-04-25 PROCEDURE — 82565 ASSAY OF CREATININE: CPT | Performed by: OBSTETRICS & GYNECOLOGY

## 2022-04-25 PROCEDURE — 36415 COLL VENOUS BLD VENIPUNCTURE: CPT | Performed by: OBSTETRICS & GYNECOLOGY

## 2022-04-25 PROCEDURE — 3E0P7VZ INTRODUCTION OF HORMONE INTO FEMALE REPRODUCTIVE, VIA NATURAL OR ARTIFICIAL OPENING: ICD-10-PCS | Performed by: OBSTETRICS & GYNECOLOGY

## 2022-04-25 PROCEDURE — 87635 SARS-COV-2 COVID-19 AMP PRB: CPT | Performed by: OBSTETRICS & GYNECOLOGY

## 2022-04-25 PROCEDURE — 84156 ASSAY OF PROTEIN URINE: CPT | Performed by: OBSTETRICS & GYNECOLOGY

## 2022-04-25 RX ORDER — MAGNESIUM SULFATE HEPTAHYDRATE 500 MG/ML
10 INJECTION, SOLUTION INTRAMUSCULAR; INTRAVENOUS
Status: DISCONTINUED | OUTPATIENT
Start: 2022-04-25 | End: 2022-04-29 | Stop reason: HOSPADM

## 2022-04-25 RX ORDER — MORPHINE SULFATE 10 MG/ML
10 INJECTION, SOLUTION INTRAMUSCULAR; INTRAVENOUS
Status: DISCONTINUED | OUTPATIENT
Start: 2022-04-25 | End: 2022-04-25 | Stop reason: CLARIF

## 2022-04-25 RX ORDER — TERBUTALINE SULFATE 1 MG/ML
0.25 INJECTION, SOLUTION SUBCUTANEOUS
Status: DISCONTINUED | OUTPATIENT
Start: 2022-04-25 | End: 2022-04-27 | Stop reason: HOSPADM

## 2022-04-25 RX ORDER — SODIUM CHLORIDE, SODIUM LACTATE, POTASSIUM CHLORIDE, CALCIUM CHLORIDE 600; 310; 30; 20 MG/100ML; MG/100ML; MG/100ML; MG/100ML
10-125 INJECTION, SOLUTION INTRAVENOUS CONTINUOUS
Status: DISCONTINUED | OUTPATIENT
Start: 2022-04-25 | End: 2022-04-29 | Stop reason: HOSPADM

## 2022-04-25 RX ORDER — MAGNESIUM SULFATE 4 G/50ML
4 INJECTION INTRAVENOUS
Status: DISCONTINUED | OUTPATIENT
Start: 2022-04-25 | End: 2022-04-29 | Stop reason: HOSPADM

## 2022-04-25 RX ORDER — HYDROXYZINE HYDROCHLORIDE 25 MG/1
50 TABLET, FILM COATED ORAL
Status: DISCONTINUED | OUTPATIENT
Start: 2022-04-25 | End: 2022-04-27 | Stop reason: HOSPADM

## 2022-04-25 RX ORDER — LORAZEPAM 2 MG/ML
2 INJECTION INTRAMUSCULAR
Status: DISCONTINUED | OUTPATIENT
Start: 2022-04-25 | End: 2022-04-29 | Stop reason: HOSPADM

## 2022-04-25 RX ORDER — LABETALOL 100 MG/1
100 TABLET, FILM COATED ORAL 2 TIMES DAILY
Status: DISCONTINUED | OUTPATIENT
Start: 2022-04-25 | End: 2022-04-26

## 2022-04-25 RX ORDER — LABETALOL HYDROCHLORIDE 5 MG/ML
20-80 INJECTION, SOLUTION INTRAVENOUS EVERY 10 MIN PRN
Status: DISCONTINUED | OUTPATIENT
Start: 2022-04-25 | End: 2022-04-29 | Stop reason: HOSPADM

## 2022-04-25 RX ORDER — MAGNESIUM SULFATE HEPTAHYDRATE 40 MG/ML
2 INJECTION, SOLUTION INTRAVENOUS
Status: DISCONTINUED | OUTPATIENT
Start: 2022-04-25 | End: 2022-04-29 | Stop reason: HOSPADM

## 2022-04-25 RX ORDER — HYDRALAZINE HYDROCHLORIDE 20 MG/ML
10 INJECTION INTRAMUSCULAR; INTRAVENOUS
Status: DISCONTINUED | OUTPATIENT
Start: 2022-04-25 | End: 2022-04-29 | Stop reason: HOSPADM

## 2022-04-25 RX ADMIN — DINOPROSTONE 10 MG: 10 INSERT VAGINAL at 20:08

## 2022-04-25 RX ADMIN — HYDROXYZINE HYDROCHLORIDE 50 MG: 25 TABLET, FILM COATED ORAL at 22:36

## 2022-04-25 RX ADMIN — LABETALOL HYDROCHLORIDE 100 MG: 100 TABLET, FILM COATED ORAL at 20:26

## 2022-04-25 ASSESSMENT — ACTIVITIES OF DAILY LIVING (ADL)
CHANGE_IN_FUNCTIONAL_STATUS_SINCE_ONSET_OF_CURRENT_ILLNESS/INJURY: NO
HEARING_DIFFICULTY_OR_DEAF: NO
DOING_ERRANDS_INDEPENDENTLY_DIFFICULTY: NO
WALKING_OR_CLIMBING_STAIRS_DIFFICULTY: NO
DIFFICULTY_COMMUNICATING: NO
FALL_HISTORY_WITHIN_LAST_SIX_MONTHS: NO
CONCENTRATING,_REMEMBERING_OR_MAKING_DECISIONS_DIFFICULTY: NO
TOILETING_ISSUES: NO
WEAR_GLASSES_OR_BLIND: NO
DRESSING/BATHING_DIFFICULTY: NO
DIFFICULTY_EATING/SWALLOWING: NO

## 2022-04-26 PROBLEM — O13.9 GESTATIONAL HYPERTENSION: Status: ACTIVE | Noted: 2022-04-26

## 2022-04-26 LAB — T PALLIDUM AB SER QL: NONREACTIVE

## 2022-04-26 PROCEDURE — 250N000011 HC RX IP 250 OP 636: Performed by: OBSTETRICS & GYNECOLOGY

## 2022-04-26 PROCEDURE — 120N000001 HC R&B MED SURG/OB

## 2022-04-26 PROCEDURE — 86780 TREPONEMA PALLIDUM: CPT | Performed by: OBSTETRICS & GYNECOLOGY

## 2022-04-26 PROCEDURE — 250N000013 HC RX MED GY IP 250 OP 250 PS 637: Performed by: OBSTETRICS & GYNECOLOGY

## 2022-04-26 PROCEDURE — 258N000003 HC RX IP 258 OP 636: Performed by: OBSTETRICS & GYNECOLOGY

## 2022-04-26 PROCEDURE — 36415 COLL VENOUS BLD VENIPUNCTURE: CPT | Performed by: OBSTETRICS & GYNECOLOGY

## 2022-04-26 RX ORDER — KETOROLAC TROMETHAMINE 30 MG/ML
30 INJECTION, SOLUTION INTRAMUSCULAR; INTRAVENOUS
Status: DISCONTINUED | OUTPATIENT
Start: 2022-04-26 | End: 2022-04-29 | Stop reason: HOSPADM

## 2022-04-26 RX ORDER — METHYLERGONOVINE MALEATE 0.2 MG/ML
200 INJECTION INTRAVENOUS
Status: DISCONTINUED | OUTPATIENT
Start: 2022-04-26 | End: 2022-04-27 | Stop reason: HOSPADM

## 2022-04-26 RX ORDER — LABETALOL 100 MG/1
200 TABLET, FILM COATED ORAL EVERY 8 HOURS SCHEDULED
Status: DISCONTINUED | OUTPATIENT
Start: 2022-04-27 | End: 2022-04-27

## 2022-04-26 RX ORDER — OXYTOCIN/0.9 % SODIUM CHLORIDE 30/500 ML
100-340 PLASTIC BAG, INJECTION (ML) INTRAVENOUS CONTINUOUS PRN
Status: DISCONTINUED | OUTPATIENT
Start: 2022-04-26 | End: 2022-04-29 | Stop reason: HOSPADM

## 2022-04-26 RX ORDER — MISOPROSTOL 100 UG/1
25 TABLET ORAL
Status: DISCONTINUED | OUTPATIENT
Start: 2022-04-26 | End: 2022-04-27 | Stop reason: HOSPADM

## 2022-04-26 RX ORDER — OXYTOCIN 10 [USP'U]/ML
10 INJECTION, SOLUTION INTRAMUSCULAR; INTRAVENOUS
Status: DISCONTINUED | OUTPATIENT
Start: 2022-04-26 | End: 2022-04-27 | Stop reason: HOSPADM

## 2022-04-26 RX ORDER — PROCHLORPERAZINE MALEATE 10 MG
10 TABLET ORAL EVERY 6 HOURS PRN
Status: DISCONTINUED | OUTPATIENT
Start: 2022-04-26 | End: 2022-04-27 | Stop reason: HOSPADM

## 2022-04-26 RX ORDER — MISOPROSTOL 200 UG/1
400 TABLET ORAL
Status: DISCONTINUED | OUTPATIENT
Start: 2022-04-26 | End: 2022-04-27 | Stop reason: HOSPADM

## 2022-04-26 RX ORDER — ONDANSETRON 4 MG/1
4 TABLET, ORALLY DISINTEGRATING ORAL EVERY 6 HOURS PRN
Status: DISCONTINUED | OUTPATIENT
Start: 2022-04-26 | End: 2022-04-27 | Stop reason: HOSPADM

## 2022-04-26 RX ORDER — CITRIC ACID/SODIUM CITRATE 334-500MG
30 SOLUTION, ORAL ORAL
Status: DISCONTINUED | OUTPATIENT
Start: 2022-04-26 | End: 2022-04-27 | Stop reason: HOSPADM

## 2022-04-26 RX ORDER — MISOPROSTOL 200 UG/1
800 TABLET ORAL
Status: DISCONTINUED | OUTPATIENT
Start: 2022-04-26 | End: 2022-04-27 | Stop reason: HOSPADM

## 2022-04-26 RX ORDER — PROCHLORPERAZINE 25 MG
25 SUPPOSITORY, RECTAL RECTAL EVERY 12 HOURS PRN
Status: DISCONTINUED | OUTPATIENT
Start: 2022-04-26 | End: 2022-04-27 | Stop reason: HOSPADM

## 2022-04-26 RX ORDER — METOCLOPRAMIDE HYDROCHLORIDE 5 MG/ML
10 INJECTION INTRAMUSCULAR; INTRAVENOUS EVERY 6 HOURS PRN
Status: DISCONTINUED | OUTPATIENT
Start: 2022-04-26 | End: 2022-04-27 | Stop reason: HOSPADM

## 2022-04-26 RX ORDER — METOCLOPRAMIDE 10 MG/1
10 TABLET ORAL EVERY 6 HOURS PRN
Status: DISCONTINUED | OUTPATIENT
Start: 2022-04-26 | End: 2022-04-27 | Stop reason: HOSPADM

## 2022-04-26 RX ORDER — NALOXONE HYDROCHLORIDE 0.4 MG/ML
0.2 INJECTION, SOLUTION INTRAMUSCULAR; INTRAVENOUS; SUBCUTANEOUS
Status: DISCONTINUED | OUTPATIENT
Start: 2022-04-26 | End: 2022-04-27 | Stop reason: HOSPADM

## 2022-04-26 RX ORDER — NALOXONE HYDROCHLORIDE 0.4 MG/ML
0.4 INJECTION, SOLUTION INTRAMUSCULAR; INTRAVENOUS; SUBCUTANEOUS
Status: DISCONTINUED | OUTPATIENT
Start: 2022-04-26 | End: 2022-04-27 | Stop reason: HOSPADM

## 2022-04-26 RX ORDER — CARBOPROST TROMETHAMINE 250 UG/ML
250 INJECTION, SOLUTION INTRAMUSCULAR
Status: DISCONTINUED | OUTPATIENT
Start: 2022-04-26 | End: 2022-04-27 | Stop reason: HOSPADM

## 2022-04-26 RX ORDER — IBUPROFEN 800 MG/1
800 TABLET, FILM COATED ORAL
Status: DISCONTINUED | OUTPATIENT
Start: 2022-04-26 | End: 2022-04-29 | Stop reason: HOSPADM

## 2022-04-26 RX ORDER — OXYTOCIN 10 [USP'U]/ML
10 INJECTION, SOLUTION INTRAMUSCULAR; INTRAVENOUS
Status: DISCONTINUED | OUTPATIENT
Start: 2022-04-26 | End: 2022-04-29 | Stop reason: HOSPADM

## 2022-04-26 RX ORDER — OXYTOCIN/0.9 % SODIUM CHLORIDE 30/500 ML
340 PLASTIC BAG, INJECTION (ML) INTRAVENOUS CONTINUOUS PRN
Status: DISCONTINUED | OUTPATIENT
Start: 2022-04-26 | End: 2022-04-27 | Stop reason: HOSPADM

## 2022-04-26 RX ORDER — ONDANSETRON 2 MG/ML
4 INJECTION INTRAMUSCULAR; INTRAVENOUS EVERY 6 HOURS PRN
Status: DISCONTINUED | OUTPATIENT
Start: 2022-04-26 | End: 2022-04-27 | Stop reason: HOSPADM

## 2022-04-26 RX ADMIN — LABETALOL HYDROCHLORIDE 100 MG: 100 TABLET, FILM COATED ORAL at 20:01

## 2022-04-26 RX ADMIN — MISOPROSTOL 25 MCG: 100 TABLET ORAL at 18:34

## 2022-04-26 RX ADMIN — MISOPROSTOL 25 MCG: 100 TABLET ORAL at 20:31

## 2022-04-26 RX ADMIN — MISOPROSTOL 25 MCG: 100 TABLET ORAL at 16:38

## 2022-04-26 RX ADMIN — SODIUM CHLORIDE, POTASSIUM CHLORIDE, SODIUM LACTATE AND CALCIUM CHLORIDE 500 ML: 600; 310; 30; 20 INJECTION, SOLUTION INTRAVENOUS at 11:52

## 2022-04-26 RX ADMIN — MISOPROSTOL 25 MCG: 100 TABLET ORAL at 12:15

## 2022-04-26 RX ADMIN — MISOPROSTOL 25 MCG: 100 TABLET ORAL at 10:00

## 2022-04-26 RX ADMIN — HYDROXYZINE HYDROCHLORIDE 50 MG: 25 TABLET, FILM COATED ORAL at 20:01

## 2022-04-26 RX ADMIN — MISOPROSTOL 25 MCG: 100 TABLET ORAL at 14:28

## 2022-04-26 RX ADMIN — LABETALOL HYDROCHLORIDE 20 MG: 5 INJECTION, SOLUTION INTRAVENOUS at 16:51

## 2022-04-26 RX ADMIN — LABETALOL HYDROCHLORIDE 40 MG: 5 INJECTION, SOLUTION INTRAVENOUS at 17:32

## 2022-04-26 RX ADMIN — LABETALOL HYDROCHLORIDE 100 MG: 100 TABLET, FILM COATED ORAL at 08:09

## 2022-04-26 RX ADMIN — MISOPROSTOL 25 MCG: 100 TABLET ORAL at 22:25

## 2022-04-26 NOTE — H&P
[facility] Labor and Delivery History and Physical    Seven Brown MRN# [id number]   Age: 27 year old YOB: 1995     Date of Admission:  2022    Primary care provider: No Ref-Primary, Physician           Chief Complaint:   Seven Brown is a 27 year old female who is 40w5d pregnant and being admitted for IOL due to gestational HTN. She had cervidil last night. She reports some mild cramping. No HA/VC/RUQ pain. No other concerns.   She was here about a week ago for attempted IOL but did not progress and opted to go home.           Pregnancy history:     OBSTETRIC HISTORY:    OB History    Para Term  AB Living   1 0 0 0 0 0   SAB IAB Ectopic Multiple Live Births   0 0 0 0 0      # Outcome Date GA Lbr Chay/2nd Weight Sex Delivery Anes PTL Lv   1 Current                EDC: Estimated Date of Delivery: 22    Prenatal complications: gestational HTN      Prenatal Labs:   Lab Results   Component Value Date    AS Negative 2022    HGB 10.7 (L) 2022       GBS Status:   Lab Results   Component Value Date    GBS Negative 2022       Active Problem List  Patient Active Problem List   Diagnosis     Gestational hypertension, third trimester     Gestational hypertension       Medication Prior to Admission  Medications Prior to Admission   Medication Sig Dispense Refill Last Dose     labetalol (NORMODYNE) 100 MG tablet Take 1 tablet (100 mg) by mouth 2 times daily for 15 days 30 tablet 0      omeprazole (PRILOSEC) 10 MG DR capsule Take 20 mg by mouth daily as needed        ondansetron (ZOFRAN) 4 MG tablet Take 4 mg by mouth every 8 hours as needed for nausea        Prenatal Vit-Fe Fumarate-FA (PRENATAL MULTIVITAMIN W/IRON) 27-0.8 MG tablet Take 1 tablet by mouth daily        Vitamin D, Cholecalciferol, 10 MCG (400 UNIT) TABS       .        Maternal Past Medical History:   No past medical history on file.                       Review of Systems:   The Review  of Systems is negative other than noted in the HPI          Physical Exam:   Vitals were reviewed  Temp: 98.4  F (36.9  C) Temp src: Oral BP: (!) 145/97 Pulse: 88   Resp: 18 SpO2: 97 % O2 Device: None (Room air)    Gen: NAD  Abd: gravid, soft, NTTP  SVE: 1-2/60/-2 per RN  TOCO: occasional contractions  FHT: category 1 (she did have a prolonged decel early this morning that resolved with position change)                       Assessment:   Seven Brown is a 40w5d pregnant female admitted for IOL due gestational HTN. Cervix still not quite favorable for pitocin. Blood pressures elevated but not severe.            Plan:   Admit - see IP orders  Will start oral cytotec. Anticipate eventual .   Continue to monitor blood pressures closely.     Gail Victor MD

## 2022-04-26 NOTE — PROGRESS NOTES
Labor note    S: pt feeling some cramping but nothing intense. Reports a moderate HA. No visual changes or RUQ pain.    O: Temp: 98  F (36.7  C) Temp src: Oral BP: (!) 184/110 Pulse: 74   Resp: 18 SpO2: 100 % O2 Device: None (Room air)       Gen: NAD    SVE: 2-3/60/-2    TOCO: q2-4     FHT: category 1    A/P: IOL due to gestational HTN  -Fetal well being reassuring  -BP severe range x 2, IV labetolol given; will continue per protocol as indicated  -Will continue oral cytotec

## 2022-04-26 NOTE — PROVIDER NOTIFICATION
Dr. Palmer notified of patient's arrival for cervical ripening.   Plan for cervidil, oncoming RN to check cervix. PIH labs ordered. Pt taking 100mg labetalol BID, plan to resume. Okay for vistaril for sleep.     Report given to Kimber MAHARAJ RN

## 2022-04-26 NOTE — PROGRESS NOTES
Patient in severe hypertension range (184/110) at 1600 vitals. Repeat BP after 15 minutes (178/107). 40 mg labetalol administered, recheck 175/108, 80 mg administered. BPs coming down but still elevated. Dr. Victor is aware of BPs.

## 2022-04-26 NOTE — PLAN OF CARE
Problem: Change in Fetal Wellbeing (Labor)  Goal: Stable Fetal Wellbeing  Outcome: Ongoing, Progressing  Intervention: Promote and Monitor Fetal Wellbeing  Recent Flowsheet Documentation  Taken 4/26/2022 0904 by Tamika Bishop RN  Body Position: position changed independently     Problem: Delayed Labor Progression (Labor)  Goal: Effective Progression to Delivery  Outcome: Ongoing, Progressing     Problem: Labor Pain (Labor)  Goal: Acceptable Pain Control  Outcome: Ongoing, Progressing     Patient induced for gestational HTN. Patient and baby tolerating labor. Patient is feeling contractions and describes the pain and crampy. She is able to talk and rest through contractions. Pain has been manageable and position changes and birthing ball have been acceptable interventions. Patient has received 5 doses of cytotec and is making slow change.

## 2022-04-26 NOTE — PROVIDER NOTIFICATION
Notified Dr. Palmer of SVE and blood pressures. Orders given to notify provider if blood pressures become severe range (>160/110).

## 2022-04-27 ENCOUNTER — ANESTHESIA EVENT (OUTPATIENT)
Dept: ANESTHESIOLOGY | Facility: CLINIC | Age: 27
End: 2022-04-27
Payer: COMMERCIAL

## 2022-04-27 ENCOUNTER — ANESTHESIA (OUTPATIENT)
Dept: ANESTHESIOLOGY | Facility: CLINIC | Age: 27
End: 2022-04-27
Payer: COMMERCIAL

## 2022-04-27 LAB
ALT SERPL W P-5'-P-CCNC: <9 U/L (ref 0–45)
AST SERPL W P-5'-P-CCNC: 14 U/L (ref 0–40)
CREAT SERPL-MCNC: 0.59 MG/DL (ref 0.6–1.1)
ERYTHROCYTE [DISTWIDTH] IN BLOOD BY AUTOMATED COUNT: 14.8 % (ref 10–15)
GFR SERPL CREATININE-BSD FRML MDRD: >90 ML/MIN/1.73M2
HCT VFR BLD AUTO: 36.2 % (ref 35–47)
HGB BLD-MCNC: 12.1 G/DL (ref 11.7–15.7)
MCH RBC QN AUTO: 27.4 PG (ref 26.5–33)
MCHC RBC AUTO-ENTMCNC: 33.4 G/DL (ref 31.5–36.5)
MCV RBC AUTO: 82 FL (ref 78–100)
PLATELET # BLD AUTO: 324 10E3/UL (ref 150–450)
RBC # BLD AUTO: 4.42 10E6/UL (ref 3.8–5.2)
WBC # BLD AUTO: 11.5 10E3/UL (ref 4–11)

## 2022-04-27 PROCEDURE — 250N000009 HC RX 250: Performed by: OBSTETRICS & GYNECOLOGY

## 2022-04-27 PROCEDURE — 00HU33Z INSERTION OF INFUSION DEVICE INTO SPINAL CANAL, PERCUTANEOUS APPROACH: ICD-10-PCS | Performed by: ANESTHESIOLOGY

## 2022-04-27 PROCEDURE — 722N000001 HC LABOR CARE VAGINAL DELIVERY SINGLE

## 2022-04-27 PROCEDURE — 999N000016 HC STATISTIC ATTENDANCE AT DELIVERY

## 2022-04-27 PROCEDURE — 120N000001 HC R&B MED SURG/OB

## 2022-04-27 PROCEDURE — 3E0R3BZ INTRODUCTION OF ANESTHETIC AGENT INTO SPINAL CANAL, PERCUTANEOUS APPROACH: ICD-10-PCS | Performed by: ANESTHESIOLOGY

## 2022-04-27 PROCEDURE — 370N000003 HC ANESTHESIA WARD SERVICE

## 2022-04-27 PROCEDURE — 82565 ASSAY OF CREATININE: CPT | Performed by: OBSTETRICS & GYNECOLOGY

## 2022-04-27 PROCEDURE — 250N000011 HC RX IP 250 OP 636: Performed by: OBSTETRICS & GYNECOLOGY

## 2022-04-27 PROCEDURE — 250N000013 HC RX MED GY IP 250 OP 250 PS 637: Performed by: OBSTETRICS & GYNECOLOGY

## 2022-04-27 PROCEDURE — 84460 ALANINE AMINO (ALT) (SGPT): CPT | Performed by: OBSTETRICS & GYNECOLOGY

## 2022-04-27 PROCEDURE — 85027 COMPLETE CBC AUTOMATED: CPT | Performed by: OBSTETRICS & GYNECOLOGY

## 2022-04-27 PROCEDURE — 0KQM0ZZ REPAIR PERINEUM MUSCLE, OPEN APPROACH: ICD-10-PCS | Performed by: OBSTETRICS & GYNECOLOGY

## 2022-04-27 PROCEDURE — 84450 TRANSFERASE (AST) (SGOT): CPT | Performed by: OBSTETRICS & GYNECOLOGY

## 2022-04-27 PROCEDURE — 36415 COLL VENOUS BLD VENIPUNCTURE: CPT | Performed by: OBSTETRICS & GYNECOLOGY

## 2022-04-27 RX ORDER — MAGNESIUM SULFATE 4 G/50ML
4 INJECTION INTRAVENOUS ONCE
Status: COMPLETED | OUTPATIENT
Start: 2022-04-27 | End: 2022-04-27

## 2022-04-27 RX ORDER — MAGNESIUM SULFATE IN WATER 40 MG/ML
2 INJECTION, SOLUTION INTRAVENOUS CONTINUOUS
Status: DISCONTINUED | OUTPATIENT
Start: 2022-04-27 | End: 2022-04-29 | Stop reason: HOSPADM

## 2022-04-27 RX ORDER — MAGNESIUM SULFATE HEPTAHYDRATE 40 MG/ML
2 INJECTION, SOLUTION INTRAVENOUS
Status: DISCONTINUED | OUTPATIENT
Start: 2022-04-27 | End: 2022-04-29 | Stop reason: HOSPADM

## 2022-04-27 RX ORDER — FENTANYL CITRATE-0.9 % NACL/PF 10 MCG/ML
100 PLASTIC BAG, INJECTION (ML) INTRAVENOUS EVERY 5 MIN PRN
Status: DISCONTINUED | OUTPATIENT
Start: 2022-04-27 | End: 2022-04-27 | Stop reason: HOSPADM

## 2022-04-27 RX ORDER — OXYTOCIN/0.9 % SODIUM CHLORIDE 30/500 ML
340 PLASTIC BAG, INJECTION (ML) INTRAVENOUS CONTINUOUS PRN
Status: DISCONTINUED | OUTPATIENT
Start: 2022-04-27 | End: 2022-04-29 | Stop reason: HOSPADM

## 2022-04-27 RX ORDER — HYDROCORTISONE 2.5 %
CREAM (GRAM) TOPICAL 3 TIMES DAILY PRN
Status: DISCONTINUED | OUTPATIENT
Start: 2022-04-27 | End: 2022-04-29 | Stop reason: HOSPADM

## 2022-04-27 RX ORDER — OXYTOCIN 10 [USP'U]/ML
10 INJECTION, SOLUTION INTRAMUSCULAR; INTRAVENOUS
Status: DISCONTINUED | OUTPATIENT
Start: 2022-04-27 | End: 2022-04-29 | Stop reason: HOSPADM

## 2022-04-27 RX ORDER — MAGNESIUM SULFATE HEPTAHYDRATE 500 MG/ML
10 INJECTION, SOLUTION INTRAMUSCULAR; INTRAVENOUS
Status: DISCONTINUED | OUTPATIENT
Start: 2022-04-27 | End: 2022-04-29 | Stop reason: HOSPADM

## 2022-04-27 RX ORDER — SODIUM CHLORIDE, SODIUM LACTATE, POTASSIUM CHLORIDE, CALCIUM CHLORIDE 600; 310; 30; 20 MG/100ML; MG/100ML; MG/100ML; MG/100ML
10-125 INJECTION, SOLUTION INTRAVENOUS CONTINUOUS
Status: DISCONTINUED | OUTPATIENT
Start: 2022-04-27 | End: 2022-04-29 | Stop reason: HOSPADM

## 2022-04-27 RX ORDER — MODIFIED LANOLIN
OINTMENT (GRAM) TOPICAL
Status: DISCONTINUED | OUTPATIENT
Start: 2022-04-27 | End: 2022-04-29 | Stop reason: HOSPADM

## 2022-04-27 RX ORDER — MISOPROSTOL 200 UG/1
800 TABLET ORAL
Status: DISCONTINUED | OUTPATIENT
Start: 2022-04-27 | End: 2022-04-29 | Stop reason: HOSPADM

## 2022-04-27 RX ORDER — DOCUSATE SODIUM 100 MG/1
100 CAPSULE, LIQUID FILLED ORAL DAILY
Status: DISCONTINUED | OUTPATIENT
Start: 2022-04-27 | End: 2022-04-29 | Stop reason: HOSPADM

## 2022-04-27 RX ORDER — BUPIVACAINE HYDROCHLORIDE 2.5 MG/ML
5 INJECTION, SOLUTION EPIDURAL; INFILTRATION; INTRACAUDAL ONCE
Status: DISCONTINUED | OUTPATIENT
Start: 2022-04-27 | End: 2022-04-27 | Stop reason: HOSPADM

## 2022-04-27 RX ORDER — MAGNESIUM SULFATE 4 G/50ML
4 INJECTION INTRAVENOUS
Status: DISCONTINUED | OUTPATIENT
Start: 2022-04-27 | End: 2022-04-29 | Stop reason: HOSPADM

## 2022-04-27 RX ORDER — BISACODYL 10 MG
10 SUPPOSITORY, RECTAL RECTAL DAILY PRN
Status: DISCONTINUED | OUTPATIENT
Start: 2022-04-27 | End: 2022-04-29 | Stop reason: HOSPADM

## 2022-04-27 RX ORDER — IBUPROFEN 800 MG/1
800 TABLET, FILM COATED ORAL EVERY 6 HOURS PRN
Status: DISCONTINUED | OUTPATIENT
Start: 2022-04-27 | End: 2022-04-29 | Stop reason: HOSPADM

## 2022-04-27 RX ORDER — NALBUPHINE HYDROCHLORIDE 10 MG/ML
2.5-5 INJECTION, SOLUTION INTRAMUSCULAR; INTRAVENOUS; SUBCUTANEOUS EVERY 6 HOURS PRN
Status: DISCONTINUED | OUTPATIENT
Start: 2022-04-27 | End: 2022-04-29 | Stop reason: HOSPADM

## 2022-04-27 RX ORDER — CARBOPROST TROMETHAMINE 250 UG/ML
250 INJECTION, SOLUTION INTRAMUSCULAR
Status: DISCONTINUED | OUTPATIENT
Start: 2022-04-27 | End: 2022-04-29 | Stop reason: HOSPADM

## 2022-04-27 RX ORDER — LABETALOL 100 MG/1
200 TABLET, FILM COATED ORAL EVERY 8 HOURS SCHEDULED
Status: DISCONTINUED | OUTPATIENT
Start: 2022-04-27 | End: 2022-04-28

## 2022-04-27 RX ORDER — FENTANYL CITRATE 50 UG/ML
100 INJECTION, SOLUTION INTRAMUSCULAR; INTRAVENOUS
Status: COMPLETED | OUTPATIENT
Start: 2022-04-27 | End: 2022-04-27

## 2022-04-27 RX ORDER — CALCIUM GLUCONATE 94 MG/ML
1 INJECTION, SOLUTION INTRAVENOUS
Status: DISCONTINUED | OUTPATIENT
Start: 2022-04-27 | End: 2022-04-29 | Stop reason: HOSPADM

## 2022-04-27 RX ORDER — HYDRALAZINE HYDROCHLORIDE 20 MG/ML
10 INJECTION INTRAMUSCULAR; INTRAVENOUS
Status: DISCONTINUED | OUTPATIENT
Start: 2022-04-27 | End: 2022-04-29 | Stop reason: HOSPADM

## 2022-04-27 RX ORDER — ACETAMINOPHEN 325 MG/1
650 TABLET ORAL EVERY 4 HOURS PRN
Status: DISCONTINUED | OUTPATIENT
Start: 2022-04-27 | End: 2022-04-29 | Stop reason: HOSPADM

## 2022-04-27 RX ORDER — MISOPROSTOL 200 UG/1
400 TABLET ORAL
Status: DISCONTINUED | OUTPATIENT
Start: 2022-04-27 | End: 2022-04-29 | Stop reason: HOSPADM

## 2022-04-27 RX ORDER — LABETALOL HYDROCHLORIDE 5 MG/ML
20-40 INJECTION, SOLUTION INTRAVENOUS EVERY 10 MIN PRN
Status: DISCONTINUED | OUTPATIENT
Start: 2022-04-27 | End: 2022-04-29 | Stop reason: HOSPADM

## 2022-04-27 RX ORDER — LORAZEPAM 2 MG/ML
2 INJECTION INTRAMUSCULAR
Status: DISCONTINUED | OUTPATIENT
Start: 2022-04-27 | End: 2022-04-29 | Stop reason: HOSPADM

## 2022-04-27 RX ORDER — METHYLERGONOVINE MALEATE 0.2 MG/ML
200 INJECTION INTRAVENOUS
Status: DISCONTINUED | OUTPATIENT
Start: 2022-04-27 | End: 2022-04-29 | Stop reason: HOSPADM

## 2022-04-27 RX ADMIN — LABETALOL HYDROCHLORIDE 200 MG: 100 TABLET, FILM COATED ORAL at 04:04

## 2022-04-27 RX ADMIN — FENTANYL CITRATE 100 MCG: 50 INJECTION, SOLUTION INTRAMUSCULAR; INTRAVENOUS at 06:06

## 2022-04-27 RX ADMIN — BENZOCAINE AND LEVOMENTHOL: 200; 5 SPRAY TOPICAL at 07:59

## 2022-04-27 RX ADMIN — MAGNESIUM SULFATE HEPTAHYDRATE 4 G: 80 INJECTION, SOLUTION INTRAVENOUS at 04:59

## 2022-04-27 RX ADMIN — MAGNESIUM SULFATE IN WATER 2 G/HR: 40 INJECTION, SOLUTION INTRAVENOUS at 05:36

## 2022-04-27 RX ADMIN — MISOPROSTOL 800 MCG: 200 TABLET ORAL at 08:07

## 2022-04-27 RX ADMIN — KETOROLAC TROMETHAMINE 30 MG: 30 INJECTION, SOLUTION INTRAMUSCULAR; INTRAVENOUS at 05:56

## 2022-04-27 RX ADMIN — LABETALOL HYDROCHLORIDE 40 MG: 5 INJECTION, SOLUTION INTRAVENOUS at 03:17

## 2022-04-27 RX ADMIN — LABETALOL HYDROCHLORIDE 200 MG: 100 TABLET, FILM COATED ORAL at 20:13

## 2022-04-27 RX ADMIN — LABETALOL HYDROCHLORIDE 20 MG: 5 INJECTION, SOLUTION INTRAVENOUS at 02:53

## 2022-04-27 RX ADMIN — FENTANYL CITRATE 100 MCG: 50 INJECTION, SOLUTION INTRAMUSCULAR; INTRAVENOUS at 03:57

## 2022-04-27 RX ADMIN — ACETAMINOPHEN 650 MG: 325 TABLET ORAL at 18:05

## 2022-04-27 RX ADMIN — DOCUSATE SODIUM 100 MG: 100 CAPSULE, LIQUID FILLED ORAL at 08:00

## 2022-04-27 RX ADMIN — IBUPROFEN 800 MG: 800 TABLET ORAL at 12:02

## 2022-04-27 RX ADMIN — HYDRALAZINE HYDROCHLORIDE 5 MG: 20 INJECTION INTRAMUSCULAR; INTRAVENOUS at 04:45

## 2022-04-27 RX ADMIN — LIDOCAINE HYDROCHLORIDE 20 ML: 10 INJECTION, SOLUTION INFILTRATION; PERINEURAL at 05:56

## 2022-04-27 RX ADMIN — IBUPROFEN 800 MG: 800 TABLET ORAL at 18:05

## 2022-04-27 RX ADMIN — LABETALOL HYDROCHLORIDE 200 MG: 100 TABLET, FILM COATED ORAL at 12:02

## 2022-04-27 RX ADMIN — ACETAMINOPHEN 650 MG: 325 TABLET ORAL at 12:02

## 2022-04-27 RX ADMIN — Medication 340 ML/HR: at 05:50

## 2022-04-27 RX ADMIN — Medication 100 ML/HR: at 08:17

## 2022-04-27 NOTE — L&D DELIVERY NOTE
Vaginal Delivery Note    Name: Seven Brown  : 1995  MRN: [id number]      PRE DELIVERY DIAGNOSIS  1) 27 year old  1 Para 0 at 40w6d     2) GBS negative  3) Gestational HTN    POST DELIVERY DIAGNOSIS  1) 27 year old  @ 40w6d  2) Delivery of a viable male, apgars 9/9, weight pending     Procedure:     Augmentation: no               Indication:  Induction: yes                       Indication: gestational HTN      Monitors: external     ROM: SROM - unknown time, patient never reported gush of fluids and there was scant fluid at delivery  Fluid Type: clear    Labor Analgesia/Anesthesia: nitrous, fentanyl    Cord pH obtained: no  Placenta submitted to Pathology: no    Description of procedure:   27 year old  with PNC w/ ATWS and pregnancy complicated by gestational HTN and previous failed induction presented to L&D with plan for induction .  Her hospital course was complicated by severe range blood pressures. Despite antihypertensives pressures progressively worsened and she was therefore started on magnesium prophylaxis just prior to delivery.  Patient progressed to complete with cervidil and oral cytotec.      Infant spontaneously delivered over an 2nd degree laceration.  Infant delivered in ANTONIA position.    Nuchal cord: no     Shoulder Dystocia: No  Operative Vaginal Delivery: No    Placenta spontaneously delivered  grossly intact with 3 vessel cord.    Laceration was repaired in the normal fashion using local anesthetic using 3-0 rapide.      Infant:  Live, vigorous infant was handed to mom.    Delivery was complicated by nothing.  Interventions included routine postpartum pitocin and fundal massage.  Total estimated blood loss (mL): 200       Mother and Infant stable.      2022 7:27 AM

## 2022-04-27 NOTE — PROVIDER NOTIFICATION
Second RN at bedside to assist pt up to bathroom to empty bladder. Small amount of clots passed on toilet, moderate amount of bleeding while on toilet. Pt able to void successfully.

## 2022-04-27 NOTE — PROVIDER NOTIFICATION
Charge nurse called for assistance to bedside, cytotec 800 administered rectally. Phone call to provider, order to administer second bag of pitocin and continue to monitor pt.

## 2022-04-27 NOTE — ANESTHESIA PREPROCEDURE EVALUATION
Anesthesia Pre-Procedure Evaluation    Patient: Seven Brown   MRN: [id number] : 1995        Procedure : * No procedures listed *          No past medical history on file.   No past surgical history on file.   No Known Allergies   Social History     Tobacco Use     Smoking status: Never Smoker     Smokeless tobacco: Never Used   Substance Use Topics     Alcohol use: Not Currently      Wt Readings from Last 1 Encounters:   22 109.3 kg (241 lb)              OUTSIDE LABS:  CBC:   Lab Results   Component Value Date    WBC 11.5 (H) 2022    WBC 6.9 2022    HGB 12.1 2022    HGB 10.7 (L) 2022    HCT 36.2 2022    HCT 32.1 (L) 2022     2022     2022     BMP:   Lab Results   Component Value Date    CR 0.59 (L) 2022    CR 0.67 2022     COAGS:   Lab Results   Component Value Date    PTT 26 2022    INR 0.99 2022     POC: No results found for: BGM, HCG, HCGS  HEPATIC:   Lab Results   Component Value Date    ALT <9 2022    AST 14 2022     OTHER: No results found for: PH, LACT, A1C, LISA, PHOS, MAG, LIPASE, AMYLASE, TSH, T4, T3, CRP, SED    Anesthesia Plan                        Consents            Postoperative Care            Comments:    Other Comments: No labor analgesia was provided, note was created in error.            Bishop Rick MD

## 2022-04-27 NOTE — PLAN OF CARE
Patient VSS.  Up voiding without problem.  Blood pressure appropriate this afternoon.  Fundus firm.  Pain managed with PO Ibuprofen and Tylenol.  Will call with needs.    Problem: Adjustment to Role Transition (Postpartum Vaginal Delivery)  Goal: Successful Maternal Role Transition  Outcome: Ongoing, Progressing     Problem: Bleeding (Postpartum Vaginal Delivery)  Goal: Hemostasis  Outcome: Ongoing, Progressing     Problem: Infection (Postpartum Vaginal Delivery)  Goal: Absence of Infection Signs/Symptoms  Outcome: Ongoing, Progressing     Problem: Pain (Postpartum Vaginal Delivery)  Goal: Acceptable Pain Control  Outcome: Ongoing, Progressing     Problem: Urinary Retention (Postpartum Vaginal Delivery)  Goal: Effective Urinary Elimination  Outcome: Ongoing, Progressing

## 2022-04-27 NOTE — PROGRESS NOTES
Labor note    S: pt reports that contractions became much more painful around 2300. She complains of uterine pain as well as pressure like she needs to have a BM.    O: Temp: 98.2  F (36.8  C) Temp src: Oral BP: (!) 150/95 Pulse: 74   Resp: 18 SpO2: 100 % O2 Device: None (Room air)     Gen: breathing through contractions, visibly uncomfortable with contractions  SVE: 3/70/-1  [place]: q3-4  FHT: category 1    A/p: IOL for gestational HTN.  Blood pressures recently in normal range with some moderately elevated but below severe range. Will continue to monitor closely. Cervix has changed somewhat but not as much as expected given patient's reported pain. Offered pain meds. She would like to try nitrous oxide. Will hold off on additional doses of cytotec and manage expectantly for now. If contractions space out, will plan to start pitocin. She agrees with plan.

## 2022-04-27 NOTE — PROGRESS NOTES
Patient notes a define change in intensity of contractions over the last hour. Rating her pain 9/10. She is breathing through contractions well. Declines any pain interventions at this time. Will hold next dose of Cytotec.

## 2022-04-27 NOTE — ANESTHESIA POSTPROCEDURE EVALUATION
Patient: Seven Brown    Procedure: * No procedures listed *       Anesthesia Type:  No value filed.    Note:     Postop Pain Control:    PONV:    Neuro/Psych:    Airway/Respiratory:    CV/Hemodynamics:    Other NRE:    DID A NON-ROUTINE EVENT OCCUR?     Event details/Postop Comments:  No labor analgesia was provided for labor and delivery.  Note was created in error.           Last vitals:  Vitals Value Taken Time   BP     Temp     Pulse     Resp     SpO2         Electronically Signed By: Bishop Rick MD  April 27, 2022  2:22 PM

## 2022-04-27 NOTE — PROGRESS NOTES
Labor note    S: patient very uncomfortable with contractions despite IV fentanyl. She reports she did not get any relief with it.     O: Temp: 98.2  F (36.8  C) Temp src: Oral BP: (!) 196/114 Pulse: 74   Resp: 18 SpO2: 100 % O2 Device: None (Room air)     Gen: significant distress due to contractions  SVE: deferred   TOCO: q3  FHT: category 1    A/P: IOL for gestational HTN  -Recommended to patient that she reconsider epidural. While she did not plan or want to get one, she is not tolerating labor well and would greatly benefit from one. I explained that since she is so tense with pain she may be inhibiting her body from dilating. While I did not recheck her at this time, on her last check she had made only minimal change despite painful contractions. I gave her time to think about it and discuss with her . After about 15 minutes she agreed to proceed with epidural. Bolus started.  -Blood pressures have been in the severe range again. She received both oral and IV labetelol and her last pressures were even higher. I suspect this is somewhat due to pain but cannot know for sure. Given persistent and increasing pressures will recheck hellp labs and start magnesium prophylaxis. Will also give hydralazine 5mg now. As she has opted to get an epidural, will not be overly aggressive with antihypertensives until she gets epidural in order to avoid dropping her pressure too much.   -Once comfortable will plan to recheck cervix.

## 2022-04-27 NOTE — PROGRESS NOTES
Dr. Victor updated on BP. 2 doses of IV labetalol given per the hypertensive algorithm. BP after 2nd dose remains high. Patient appears to not be coping well with contractions, education provided on pain options. She is requesting IV Fentanyl at this time. Per Dr. Victor, give Fentanyl and scheduled oral labetalol and recheck BP.

## 2022-04-28 LAB — HGB BLD-MCNC: 8 G/DL (ref 11.7–15.7)

## 2022-04-28 PROCEDURE — 85018 HEMOGLOBIN: CPT | Performed by: OBSTETRICS & GYNECOLOGY

## 2022-04-28 PROCEDURE — 36415 COLL VENOUS BLD VENIPUNCTURE: CPT | Performed by: OBSTETRICS & GYNECOLOGY

## 2022-04-28 PROCEDURE — 120N000001 HC R&B MED SURG/OB

## 2022-04-28 PROCEDURE — 250N000011 HC RX IP 250 OP 636: Performed by: OBSTETRICS & GYNECOLOGY

## 2022-04-28 PROCEDURE — 250N000013 HC RX MED GY IP 250 OP 250 PS 637: Performed by: OBSTETRICS & GYNECOLOGY

## 2022-04-28 RX ORDER — LABETALOL 100 MG/1
100 TABLET, FILM COATED ORAL EVERY 12 HOURS SCHEDULED
Status: DISCONTINUED | OUTPATIENT
Start: 2022-04-28 | End: 2022-04-29 | Stop reason: HOSPADM

## 2022-04-28 RX ADMIN — DOCUSATE SODIUM 100 MG: 100 CAPSULE, LIQUID FILLED ORAL at 08:27

## 2022-04-28 RX ADMIN — LABETALOL HYDROCHLORIDE 100 MG: 100 TABLET, FILM COATED ORAL at 20:40

## 2022-04-28 RX ADMIN — LABETALOL HYDROCHLORIDE 200 MG: 100 TABLET, FILM COATED ORAL at 04:35

## 2022-04-28 RX ADMIN — Medication: at 20:44

## 2022-04-28 RX ADMIN — IBUPROFEN 800 MG: 800 TABLET ORAL at 22:22

## 2022-04-28 RX ADMIN — MAGNESIUM SULFATE IN WATER 2 G/HR: 40 INJECTION, SOLUTION INTRAVENOUS at 01:49

## 2022-04-28 RX ADMIN — IBUPROFEN 800 MG: 800 TABLET ORAL at 15:55

## 2022-04-28 RX ADMIN — ACETAMINOPHEN 650 MG: 325 TABLET ORAL at 20:43

## 2022-04-28 RX ADMIN — BENZOCAINE AND LEVOMENTHOL: 200; 5 SPRAY TOPICAL at 20:44

## 2022-04-28 RX ADMIN — ACETAMINOPHEN 650 MG: 325 TABLET ORAL at 08:27

## 2022-04-28 RX ADMIN — IBUPROFEN 800 MG: 800 TABLET ORAL at 08:27

## 2022-04-28 NOTE — LACTATION NOTE
This note was copied from a baby's chart.  Rounded on family for lactation support.  Mom induced for hypertension and Magnesium therapy that was dc'd this am. Mom was concerned about her milk supply and baby's intake so she elected to supplement with donor breast milk and is using the hospital grade breastpump.  She has a [employer]om Opera pump for home use.   Mom expressed that some latches are quite painful.  She has fed primarily on the right breast due to IV therapy.  Educated/reviewed hand expression, milk production and signs of milk transfer and unlatching.  Encouraged review of the education folders.   Assisted mom with an asymmetrical latch.  Baby eager and mom needs to focus on positioning to avoid the nipple latch.  Baby able to achieve a deep latch with audible swallows.  Mom reported no pain and was pleased with baby's success.    Mom encouraged to continue pumping after breastfeedings to optimize milk production especially with hypertension and low hemoglobin.    Kathleen Arevalo RN

## 2022-04-28 NOTE — CONSULTS
Integrative Therapy Consult    Healing PresenceYes  Essential Oils: Topical (EO/Topical Oil)     Cee -  HC, Lavender Massage Oil - HC       Healing Music:       Breathwork:       Guided Imagery:       Acupressure:       Oshibori:       Energy Therapy:       Healing Touch:       Reiki:       Qi Gong:     Massage: Foot      Targeted Massage:    Sleep Promotion:       Other Therapy:       Intervention Reason: Edema     Pre and Post Session Scores: Patient Desires Treatment: yes                             Delivery:         Referrals:      Stacy Foote

## 2022-04-28 NOTE — PROGRESS NOTES
Vaginal Delivery Postpartum Day 1    Patient Name:  Seven Brown  :  1995  MRN:  [id number]      Subjective:  The patient feels well:  Voiding without difficulty, lochia normal, tolerating normal diet, and passing flatus.  Pain is well controlled with current medications.  The patient has no emotional concerns.  The baby is well and being fed by both breast and bottle. Patient had magnesium discontinued early this morning. Blood pressures have been in normal range.     Objective:  GEN: NAD, A&O x3   ABD: soft and NT  Uterus: firm and NT  EXT: mild edema    Urinary output is adequate.     Hgb 8.0    No results found for requested labs within last 2700 days.     Assessment: PPD 1. Blood pressures improved. Blood loss anemia noted. Patient denies dizziness or lightheadedness.  Plan:  Continue routine postpartum care. Offered iron infusion but she declines. Recommend planning to take oral iron supplement for the next few months to treat anemia. Labetelol dose changed back to 100mg BID since pressures have been better. Will continue to monitor.   Provider:  Gail Victor MD

## 2022-04-28 NOTE — PLAN OF CARE
Problem: Pain (Postpartum Vaginal Delivery)  Goal: Acceptable Pain Control  Outcome: Ongoing, Progressing   Patient states pain is tolerable. Taking ibuprofen for pain.

## 2022-04-28 NOTE — PROVIDER NOTIFICATION
Patients Hgb dropped from 12.1 - 8.0. Dr Victor updated, plan to talk with patient when she rounds. No new orders at this time.    Mag stopped at 0620. BP's within normal range and denies symptoms. Will continue to monitor.     Mora Horta RN

## 2022-04-29 VITALS
SYSTOLIC BLOOD PRESSURE: 140 MMHG | DIASTOLIC BLOOD PRESSURE: 76 MMHG | TEMPERATURE: 98 F | HEART RATE: 92 BPM | OXYGEN SATURATION: 100 % | RESPIRATION RATE: 16 BRPM

## 2022-04-29 PROCEDURE — 250N000013 HC RX MED GY IP 250 OP 250 PS 637: Performed by: OBSTETRICS & GYNECOLOGY

## 2022-04-29 RX ORDER — LABETALOL 100 MG/1
100 TABLET, FILM COATED ORAL 2 TIMES DAILY
Qty: 30 TABLET | Refills: 1 | Status: SHIPPED | OUTPATIENT
Start: 2022-04-29

## 2022-04-29 RX ORDER — ASPIRIN 81 MG
100 TABLET, DELAYED RELEASE (ENTERIC COATED) ORAL EVERY 12 HOURS PRN
Qty: 60 TABLET | Refills: 1 | Status: SHIPPED | OUTPATIENT
Start: 2022-04-29

## 2022-04-29 RX ORDER — LABETALOL 100 MG/1
200 TABLET, FILM COATED ORAL ONCE
Status: COMPLETED | OUTPATIENT
Start: 2022-04-29 | End: 2022-04-29

## 2022-04-29 RX ORDER — IBUPROFEN 800 MG/1
800 TABLET, FILM COATED ORAL EVERY 8 HOURS PRN
Qty: 30 TABLET | Refills: 1 | Status: SHIPPED | OUTPATIENT
Start: 2022-04-29

## 2022-04-29 RX ORDER — LABETALOL 100 MG/1
100 TABLET, FILM COATED ORAL EVERY 12 HOURS
Qty: 60 TABLET | Refills: 1 | Status: SHIPPED | OUTPATIENT
Start: 2022-04-29 | End: 2022-05-29

## 2022-04-29 RX ADMIN — LABETALOL HYDROCHLORIDE 200 MG: 100 TABLET, FILM COATED ORAL at 10:19

## 2022-04-29 RX ADMIN — IBUPROFEN 800 MG: 800 TABLET ORAL at 10:20

## 2022-04-29 RX ADMIN — ACETAMINOPHEN 650 MG: 325 TABLET ORAL at 04:10

## 2022-04-29 RX ADMIN — DOCUSATE SODIUM 100 MG: 100 CAPSULE, LIQUID FILLED ORAL at 08:50

## 2022-04-29 RX ADMIN — IBUPROFEN 800 MG: 800 TABLET ORAL at 04:10

## 2022-04-29 RX ADMIN — LABETALOL HYDROCHLORIDE 100 MG: 100 TABLET, FILM COATED ORAL at 08:49

## 2022-04-29 NOTE — PROGRESS NOTES
"Outreach Note for EPIC    Chart reviewed, discharge plan discussed with patient, needs assessed. Patient verbalizes understanding of plan, requests [facility] visit as ordered, [facility] nurse visit planned for Sun, May 1st, Home Care Intake updated. Patient and , \"Edna Mckeon\", phone number is reported as correct in EMR.    Patient states she has good support at home, has baby care essentials, and feels ready to discharge today. Outreach RN will continue to follow and assist if needed with discharge plan. No additional needs identified at this time.        "

## 2022-04-29 NOTE — PLAN OF CARE
Mom and baby stable, afebrile. Infant breastfeeding overnight, supplemented with donor milk twice. Voiding and stooling. Will continue to monitor.     MATTHEW Alicia RN

## 2022-04-29 NOTE — DISCHARGE SUMMARY
[facility] Discharge Summary    Seven Brown MRN# [id number]   Age: 27 year old YOB: 1995     Date of Admission:  2022  Date of Discharge::  2022 12:53 PM  Admitting Physician:  Gail Victor MD  Discharge Physician:  Holly Hung MD     Home clinic: [facility]          Admission Diagnoses:   1) 28 yo  at 40w4d  2) Gestational hypertension with previous attempt at IOL, then started on PO labetalol          Discharge Diagnosis:   Normal spontaneous vaginal delivery  Intrauterine pregnancy at 40w6d weeks gestation  Severe gestational hypertension s/p 24 hrs of IV magnesium sulfate   Anemia, likely acute blood loss from delivery and postpartum  Male infant,8#, APGARs 9/9          Procedures:   Procedure(s): No additional procedures performed       No procedures performed during this admission           Medications Prior to Admission:     No medications prior to admission.             Discharge Medications:     Discharge Medication List as of 2022 12:03 PM      START taking these medications    Details   docusate sodium (COLACE) 100 MG tablet Take 1 tablet (100 mg) by mouth every 12 hours as needed for constipation, Disp-60 tablet, R-1, Local Print      ibuprofen (ADVIL/MOTRIN) 800 MG tablet Take 1 tablet (800 mg) by mouth every 8 hours as needed for moderate pain, Disp-30 tablet, R-1, Local Print         CONTINUE these medications which have CHANGED    Details   !! labetalol (NORMODYNE) 100 MG tablet Take 1 tablet (100 mg) by mouth 2 times daily, Disp-30 tablet, R-1, Local Print      !! labetalol (NORMODYNE) 100 MG tablet Take 1 tablet (100 mg) by mouth every 12 hours, Disp-60 tablet, R-1, Local Print       !! - Potential duplicate medications found. Please discuss with provider.      CONTINUE these medications which have NOT CHANGED    Details   omeprazole (PRILOSEC) 10 MG DR capsule Take 20 mg by mouth daily as needed, Historical      ondansetron (ZOFRAN) 4  MG tablet Take 4 mg by mouth every 8 hours as needed for nausea, Historical      Prenatal Vit-Fe Fumarate-FA (PRENATAL MULTIVITAMIN W/IRON) 27-0.8 MG tablet Take 1 tablet by mouth daily, Historical      Vitamin D, Cholecalciferol, 10 MCG (400 UNIT) TABS Historical                   Consultations:   No consultations were requested during this admission          Brief History of Labor:   Admitted for IOL for GHTN after prior failed induction. Underwent  and needed to start IV Magnesium in labor. Discharged home on PPD#2 on PO Labetalol.            Hospital Course:   The patient's hospital course was unremarkable.  On discharge, her pain was well controlled. Vaginal bleeding is similar to peak menstrual flow.  Voiding without difficulty.  Ambulating well and tolerating a normal diet.  No fever.  Breastfeeding well.  Infant is stable. She was discharged on post-partum day #2.    Post-partum hemoglobin:   Hemoglobin   Date Value Ref Range Status   2022 8.0 (L) 11.7 - 15.7 g/dL Final     Comment:     This result has been called to WYATT COHEN RN by Marcelina Kennedy on 2022 at 0637, and has been read back.              Discharge Instructions and Follow-Up:   Discharge diet: Regular   Discharge activity: No heavy lifting, pushing, pulling for 6 week(s)  Pelvic rest: abstain from intercourse and do not use tampons for 6 week(s)   Discharge follow-up: Follow up with [facility] in 1 week for BP check   Wound care: Drink plenty of fluids  Ice to area for comfort  Keep wound clean and dry           Discharge Disposition:   Discharged to home      Attestation:  I have reviewed today's vital signs, notes, medications, labs and imaging.    Holly Hung MD

## 2022-05-10 NOTE — ADDENDUM NOTE
Addendum  created 05/10/22 1307 by Yong Durbin MD    Clinical Note Signed, Intraprocedure Staff edited

## 2023-07-29 ENCOUNTER — HOSPITAL ENCOUNTER (OUTPATIENT)
Facility: CLINIC | Age: 28
Setting detail: OBSERVATION
Discharge: HOME | End: 2023-07-30
Attending: EMERGENCY MEDICINE | Admitting: EMERGENCY MEDICINE
Payer: COMMERCIAL

## 2023-07-29 DIAGNOSIS — R45.851 SUICIDAL IDEATION: ICD-10-CM

## 2023-07-29 DIAGNOSIS — F33.9 MAJOR DEPRESSIVE DISORDER, RECURRENT EPISODE WITH PERIPARTUM ONSET (H): ICD-10-CM

## 2023-07-29 DIAGNOSIS — T50.902A OVERDOSE, INTENTIONAL SELF-HARM, INITIAL ENCOUNTER (H): ICD-10-CM

## 2023-07-29 DIAGNOSIS — F41.9 ANXIETY: ICD-10-CM

## 2023-07-29 LAB
ALBUMIN SERPL BCG-MCNC: 4.5 G/DL (ref 3.5–5.2)
ALP SERPL-CCNC: 76 U/L (ref 35–104)
ALT SERPL W P-5'-P-CCNC: 20 U/L (ref 0–50)
ANION GAP SERPL CALCULATED.3IONS-SCNC: 13 MMOL/L (ref 7–15)
ANION GAP SERPL CALCULATED.3IONS-SCNC: 16 MMOL/L (ref 7–15)
APAP SERPL-MCNC: <5 UG/ML (ref 10–30)
AST SERPL W P-5'-P-CCNC: 29 U/L (ref 0–45)
ATRIAL RATE - MUSE: 138 BPM
BASOPHILS # BLD AUTO: 0.1 10E3/UL (ref 0–0.2)
BASOPHILS NFR BLD AUTO: 1 %
BILIRUB SERPL-MCNC: 0.2 MG/DL
BUN SERPL-MCNC: 10.3 MG/DL (ref 6–20)
BUN SERPL-MCNC: 10.3 MG/DL (ref 6–20)
CALCIUM SERPL-MCNC: 7.7 MG/DL (ref 8.6–10)
CALCIUM SERPL-MCNC: 8.6 MG/DL (ref 8.6–10)
CHLORIDE SERPL-SCNC: 107 MMOL/L (ref 98–107)
CHLORIDE SERPL-SCNC: 108 MMOL/L (ref 98–107)
CREAT SERPL-MCNC: 0.56 MG/DL (ref 0.51–0.95)
CREAT SERPL-MCNC: 0.63 MG/DL (ref 0.51–0.95)
DEPRECATED HCO3 PLAS-SCNC: 20 MMOL/L (ref 22–29)
DEPRECATED HCO3 PLAS-SCNC: 20 MMOL/L (ref 22–29)
DIASTOLIC BLOOD PRESSURE - MUSE: NORMAL MMHG
EOSINOPHIL # BLD AUTO: 0.1 10E3/UL (ref 0–0.7)
EOSINOPHIL NFR BLD AUTO: 2 %
ERYTHROCYTE [DISTWIDTH] IN BLOOD BY AUTOMATED COUNT: 13.5 % (ref 10–15)
ETHANOL SERPL-MCNC: 0.19 G/DL
GFR SERPL CREATININE-BSD FRML MDRD: >90 ML/MIN/1.73M2
GFR SERPL CREATININE-BSD FRML MDRD: >90 ML/MIN/1.73M2
GLUCOSE SERPL-MCNC: 84 MG/DL (ref 70–99)
GLUCOSE SERPL-MCNC: 90 MG/DL (ref 70–99)
HCG SERPL QL: NEGATIVE
HCT VFR BLD AUTO: 40.7 % (ref 35–47)
HGB BLD-MCNC: 13.8 G/DL (ref 11.7–15.7)
IMM GRANULOCYTES # BLD: 0 10E3/UL
IMM GRANULOCYTES NFR BLD: 0 %
INTERPRETATION ECG - MUSE: NORMAL
LYMPHOCYTES # BLD AUTO: 3.2 10E3/UL (ref 0.8–5.3)
LYMPHOCYTES NFR BLD AUTO: 50 %
MCH RBC QN AUTO: 29.3 PG (ref 26.5–33)
MCHC RBC AUTO-ENTMCNC: 33.9 G/DL (ref 31.5–36.5)
MCV RBC AUTO: 86 FL (ref 78–100)
MONOCYTES # BLD AUTO: 0.6 10E3/UL (ref 0–1.3)
MONOCYTES NFR BLD AUTO: 10 %
NEUTROPHILS # BLD AUTO: 2.3 10E3/UL (ref 1.6–8.3)
NEUTROPHILS NFR BLD AUTO: 37 %
NRBC # BLD AUTO: 0 10E3/UL
NRBC BLD AUTO-RTO: 0 /100
P AXIS - MUSE: 63 DEGREES
PLATELET # BLD AUTO: 349 10E3/UL (ref 150–450)
POTASSIUM SERPL-SCNC: 3.9 MMOL/L (ref 3.4–5.3)
POTASSIUM SERPL-SCNC: 4.2 MMOL/L (ref 3.4–5.3)
PR INTERVAL - MUSE: 112 MS
PROT SERPL-MCNC: 7.2 G/DL (ref 6.4–8.3)
QRS DURATION - MUSE: 62 MS
QT - MUSE: 276 MS
QTC - MUSE: 418 MS
R AXIS - MUSE: 42 DEGREES
RBC # BLD AUTO: 4.71 10E6/UL (ref 3.8–5.2)
SALICYLATES SERPL-MCNC: <0.3 MG/DL
SODIUM SERPL-SCNC: 141 MMOL/L (ref 136–145)
SODIUM SERPL-SCNC: 143 MMOL/L (ref 136–145)
SYSTOLIC BLOOD PRESSURE - MUSE: NORMAL MMHG
T AXIS - MUSE: -5 DEGREES
VENTRICULAR RATE- MUSE: 138 BPM
WBC # BLD AUTO: 6.2 10E3/UL (ref 4–11)

## 2023-07-29 PROCEDURE — 250N000013 HC RX MED GY IP 250 OP 250 PS 637: Performed by: NURSE PRACTITIONER

## 2023-07-29 PROCEDURE — 250N000011 HC RX IP 250 OP 636: Performed by: EMERGENCY MEDICINE

## 2023-07-29 PROCEDURE — 90791 PSYCH DIAGNOSTIC EVALUATION: CPT

## 2023-07-29 PROCEDURE — 85025 COMPLETE CBC W/AUTO DIFF WBC: CPT | Performed by: EMERGENCY MEDICINE

## 2023-07-29 PROCEDURE — 82310 ASSAY OF CALCIUM: CPT | Performed by: EMERGENCY MEDICINE

## 2023-07-29 PROCEDURE — 93005 ELECTROCARDIOGRAM TRACING: CPT

## 2023-07-29 PROCEDURE — 36415 COLL VENOUS BLD VENIPUNCTURE: CPT | Performed by: EMERGENCY MEDICINE

## 2023-07-29 PROCEDURE — 96361 HYDRATE IV INFUSION ADD-ON: CPT

## 2023-07-29 PROCEDURE — 84703 CHORIONIC GONADOTROPIN ASSAY: CPT | Performed by: EMERGENCY MEDICINE

## 2023-07-29 PROCEDURE — 99223 1ST HOSP IP/OBS HIGH 75: CPT | Performed by: NURSE PRACTITIONER

## 2023-07-29 PROCEDURE — G0378 HOSPITAL OBSERVATION PER HR: HCPCS

## 2023-07-29 PROCEDURE — 82077 ASSAY SPEC XCP UR&BREATH IA: CPT | Performed by: EMERGENCY MEDICINE

## 2023-07-29 PROCEDURE — 96374 THER/PROPH/DIAG INJ IV PUSH: CPT

## 2023-07-29 PROCEDURE — 258N000003 HC RX IP 258 OP 636: Performed by: EMERGENCY MEDICINE

## 2023-07-29 PROCEDURE — 80143 DRUG ASSAY ACETAMINOPHEN: CPT | Performed by: EMERGENCY MEDICINE

## 2023-07-29 PROCEDURE — 80179 DRUG ASSAY SALICYLATE: CPT | Performed by: EMERGENCY MEDICINE

## 2023-07-29 PROCEDURE — 99285 EMERGENCY DEPT VISIT HI MDM: CPT | Mod: 25

## 2023-07-29 RX ORDER — ESCITALOPRAM OXALATE 10 MG/1
10 TABLET ORAL DAILY
Status: DISCONTINUED | OUTPATIENT
Start: 2023-07-30 | End: 2023-07-30 | Stop reason: HOSPADM

## 2023-07-29 RX ORDER — HYDROXYZINE HYDROCHLORIDE 50 MG/1
50 TABLET, FILM COATED ORAL EVERY 6 HOURS PRN
Status: DISCONTINUED | OUTPATIENT
Start: 2023-07-29 | End: 2023-07-30 | Stop reason: HOSPADM

## 2023-07-29 RX ORDER — LORAZEPAM 2 MG/ML
1 INJECTION INTRAMUSCULAR ONCE
Status: COMPLETED | OUTPATIENT
Start: 2023-07-29 | End: 2023-07-29

## 2023-07-29 RX ORDER — ESCITALOPRAM OXALATE 5 MG/1
5 TABLET ORAL ONCE
Status: COMPLETED | OUTPATIENT
Start: 2023-07-29 | End: 2023-07-29

## 2023-07-29 RX ORDER — HYDROXYZINE HYDROCHLORIDE 25 MG/1
25 TABLET, FILM COATED ORAL EVERY 6 HOURS PRN
Status: DISCONTINUED | OUTPATIENT
Start: 2023-07-29 | End: 2023-07-30 | Stop reason: HOSPADM

## 2023-07-29 RX ADMIN — ESCITALOPRAM OXALATE 5 MG: 5 TABLET, FILM COATED ORAL at 19:27

## 2023-07-29 RX ADMIN — LORAZEPAM 1 MG: 2 INJECTION INTRAMUSCULAR; INTRAVENOUS at 07:49

## 2023-07-29 RX ADMIN — SODIUM CHLORIDE 1000 ML: 9 INJECTION, SOLUTION INTRAVENOUS at 07:59

## 2023-07-29 RX ADMIN — HYDROXYZINE HYDROCHLORIDE 25 MG: 25 TABLET ORAL at 18:16

## 2023-07-29 RX ADMIN — SODIUM CHLORIDE 1000 ML: 9 INJECTION, SOLUTION INTRAVENOUS at 12:49

## 2023-07-29 ASSESSMENT — ACTIVITIES OF DAILY LIVING (ADL)
ADLS_ACUITY_SCORE: 35
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
## 2023-07-29 NOTE — ED NOTES
Patient brought over from ED 17 at 1440.  Patient took an overdose of 50 pills of Naproxen around 4 am in a suicide attempt. She has been monitored in the emergency room all morning and early afternoon.  She rates her anxiety and her depression at a 9. She had increased anxiety and depression after the miscarriage of her baby in March. She started zoloft after that but it has not helped.  This is the first time she has taken medication for depression and anxiety but stated that it did not help.  She has a history of post partum depression after her child in 2022 and also had anxiety and depression since high school.  She is pleasant and cooperative. Her right arm was having tremors while getting water. She states she drinks twice per week and drinks 1 bottle each time.  Nursing and risk assessments completed.  Assessments reviewed with LMHP and physician. Video monitoring in progress, patient informed.  Admission information reviewed with patient. Patient given a tour of EmPATH and instructions on using the facility. Questions regarding EmPATH addressed. Pt search completed and belongings inventoried.
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
## 2023-07-29 NOTE — ED PROVIDER NOTES
"  History     Chief Complaint:  ingestion       HPI   Seven Brown is a 28 year old female with history of postpartum depression and anxiety, who presents today following an overdose.  The patient reportedly ingested a bottle of naproxen.  According to , who was relating history through sisters who are at the bedside, the patient took approximately 50 tablets, or an entire bottle, of 220 mg naproxen.  Patient's  is reporting that she vomited many of the pills, though patient states that she did not vomit.  She states that she was suicidal.      Independent Historian:   Sibling - They report that the patient has a history of postpartum depression.  The symptoms seem to worsen following a recent miscarriage.  Prior to that, the patient had a delivery April 2022, which was complicated by postpartum depression.  Sister states that the  suspects the patient was only making an impulsive gesture, and stated \"you are not being supportive, so I am going to take these pills.\"   states that the ingestion occurred at 4:30 in the morning.  The patient then vomited at about 5:00.    Review of External Notes:   Normal delivery 4/25/22.      Medications:    docusate sodium (COLACE) 100 MG tablet  ibuprofen (ADVIL/MOTRIN) 800 MG tablet  labetalol (NORMODYNE) 100 MG tablet  labetalol (NORMODYNE) 100 MG tablet  omeprazole (PRILOSEC) 10 MG DR capsule  ondansetron (ZOFRAN) 4 MG tablet  Prenatal Vit-Fe Fumarate-FA (PRENATAL MULTIVITAMIN W/IRON) 27-0.8 MG tablet  Vitamin D, Cholecalciferol, 10 MCG (400 UNIT) TABS        Past Medical History:    No past medical history on file.    Past Surgical History:    No past surgical history on file.     Physical Exam   Patient Vitals for the past 24 hrs:   BP Temp Temp src Pulse Resp SpO2   07/29/23 0714 -- -- -- (!) 134 -- --   07/29/23 0712 98/74 99.4  F (37.4  C) Temporal -- 20 --   07/29/23 0711 -- -- -- -- -- 100 %        Physical Exam  General: alert, lying on " sylvie, and accompanied by sisters.  HENT: mucous membranes dry  CV: Tachycardic rate, regular rhythm  Resp: normal effort, clear throughout, no crackles or wheezing  GI: abdomen soft and nontender, no guarding  MSK: no bony tenderness  Skin: appropriately warm and dry  Extremities: no edema, calves non-tender  Neuro: Alert, generalized body tremors.  Psych: flat affect, tearful      Emergency Department Course     ECG results from 07/29/23   EKG 12-lead, tracing only     Value    Systolic Blood Pressure     Diastolic Blood Pressure     Ventricular Rate 138    Atrial Rate 138    MD Interval 112    QRS Duration 62        QTc 418    P Axis 63    R AXIS 42    T Axis -5    Interpretation ECG      Sinus tachycardia with occasional Premature ventricular complexes  Nonspecific ST and T wave abnormality  Abnormal ECG  No previous ECGs available  Confirmed by GENERATED REPORT, COMPUTER ([id number]),  Cameron Harris ([id number]) on 7/29/2023 7:31:22 AM         Laboratory:  Labs Ordered and Resulted from Time of ED Arrival to Time of ED Departure   COMPREHENSIVE METABOLIC PANEL - Abnormal       Result Value    Sodium 143      Potassium 4.2      Chloride 107      Carbon Dioxide (CO2) 20 (*)     Anion Gap 16 (*)     Urea Nitrogen 10.3      Creatinine 0.63      Calcium 8.6      Glucose 90      Alkaline Phosphatase 76      AST 29      ALT 20      Protein Total 7.2      Albumin 4.5      Bilirubin Total 0.2      GFR Estimate >90     ETHYL ALCOHOL LEVEL - Abnormal    Alcohol ethyl 0.19 (*)    ACETAMINOPHEN LEVEL - Abnormal    Acetaminophen <5.0 (*)    BASIC METABOLIC PANEL - Abnormal    Sodium 141      Potassium 3.9      Chloride 108 (*)     Carbon Dioxide (CO2) 20 (*)     Anion Gap 13      Urea Nitrogen 10.3      Creatinine 0.56      Calcium 7.7 (*)     Glucose 84      GFR Estimate >90     SALICYLATE LEVEL - Normal    Salicylate <0.3     HCG QUALITATIVE PREGNANCY - Normal    hCG Serum Qualitative Negative     CBC WITH PLATELETS AND  DIFFERENTIAL    WBC Count 6.2      RBC Count 4.71      Hemoglobin 13.8      Hematocrit 40.7      MCV 86      MCH 29.3      MCHC 33.9      RDW 13.5      Platelet Count 349      % Neutrophils 37      % Lymphocytes 50      % Monocytes 10      % Eosinophils 2      % Basophils 1      % Immature Granulocytes 0      NRBCs per 100 WBC 0      Absolute Neutrophils 2.3      Absolute Lymphocytes 3.2      Absolute Monocytes 0.6      Absolute Eosinophils 0.1      Absolute Basophils 0.1      Absolute Immature Granulocytes 0.0      Absolute NRBCs 0.0          Emergency Department Course & Assessments:             Interventions:  Medications   LORazepam (ATIVAN) injection 1 mg (1 mg Intravenous $Given 7/29/23 0749)   0.9% sodium chloride BOLUS (0 mLs Intravenous Stopped 7/29/23 0913)   0.9% sodium chloride BOLUS (1,000 mLs Intravenous $New Bag 7/29/23 1249)        Assessments:  ED Course as of 07/29/23 1416   Sat Jul 29, 2023 0739 Poison Control Center: can cause nausea/vomiting.  Can cause met acidosis and CLAIRE.  Recheck BMP at 10:30am.  If stable, can medically clear.   1221 I rechecked the patient.  She has a very flat affect, continues to have a mild degree of tachycardia, though improved from prior.  She declines snacks brought by her sisters.  I did brief discussion with sisters again, who noted that the patient does have a lot of outpatient resources.  I really iterated the plan to have DEC evaluate the patient.  Will order another IV bolus of fluids, given persistent tachycardia.   1414 I spoke to DEC , Augusta Garcia.  Pt is experiencing PTSD symptoms after multiple losses, birth trauma, miscarriage.  Pt amenable to evaluation in EmPATH unit.         Independent Interpretation (X-rays, CTs, rhythm strip):  None    Consultations/Discussion of Management or Tests:    ED Course as of 07/29/23 1416   Sat Jul 29, 2023 0739 Poison Control Center: can cause nausea/vomiting.  Can cause met acidosis and CLAIRE.  Recheck BMP at  10:30am.  If stable, can medically clear.   1221 I rechecked the patient.  She has a very flat affect, continues to have a mild degree of tachycardia, though improved from prior.  She declines snacks brought by her sisters.  I did brief discussion with sisters again, who noted that the patient does have a lot of outpatient resources.  I really iterated the plan to have DEC evaluate the patient.  Will order another IV bolus of fluids, given persistent tachycardia.   1414 I spoke to DEC , Augusta Garcia.  Pt is experiencing PTSD symptoms after multiple losses, birth trauma, miscarriage.  Pt amenable to evaluation in EmPATH unit.       Social Determinants of Health affecting care:   None    Disposition:  Patient transferred to the empath unit.    Impression & Plan    CMS Diagnoses: none    Medical Decision Making:  Seven Brown is a 28 year old female with history of postpartum depression and anxiety, who presents today following an overdose.  On exam, she was tachycardic and tremulous.  She states that she is suicidal.  Fortunately, no evidence for coingestions aside from alcohol today.  I discussed patient with poison control, who recommended repeat BMP approximately 3 hours after arrival.  Fortunately, this demonstrates normal renal function, and improvement in metabolic acidosis.  The patient has been medically cleared.  She will be evaluated by DEC for apparent suicide attempt, and persistent suicidal ideation.    Addendum: The patient remains slightly tachycardic, but responsive to fluids.  No emergent condition apparent at this time, including fever, pulmonary embolism, anemia, or withdrawal syndrome.  Patient has been medically cleared for further evaluation in the empath unit.      Diagnosis:    ICD-10-CM    1. Overdose, intentional self-harm, initial encounter (H)  T50.902A       2. Suicidal ideation  R45.851         7/29/2023   Gege Winston MD Pepper, Tracy Lynn, MD  07/29/23 5606

## 2023-07-29 NOTE — ED NOTES
Bed: ED16  Expected date: 7/29/23  Expected time: 7:05 AM  Means of arrival: Ambulance  Comments:  HEMS 424 28F intox/Naproxen inj

## 2023-07-29 NOTE — ED TRIAGE NOTES
Patient ingested 50 tablets of Alive in attempt to self harm.  reports patient had vomiting shortly after ingestion. Sisters reports patient with history of postpartum depression and miscarriage during postpartum been going through depression and anxiety. Patient arrived and had uncontrollable right arm tremors.      Triage Assessment       Row Name 07/29/23 5190       Triage Assessment (Adult)    Airway WDL WDL       Respiratory WDL    Respiratory WDL WDL       Skin Circulation/Temperature WDL    Skin Circulation/Temperature WDL WDL       Cardiac WDL    Cardiac WDL X       Peripheral/Neurovascular WDL    Peripheral Neurovascular WDL WDL       Cognitive/Neuro/Behavioral WDL    Cognitive/Neuro/Behavioral WDL X

## 2023-07-30 VITALS
TEMPERATURE: 98.2 F | RESPIRATION RATE: 18 BRPM | BODY MASS INDEX: 33.51 KG/M2 | HEIGHT: 68 IN | OXYGEN SATURATION: 100 % | WEIGHT: 221.1 LBS | SYSTOLIC BLOOD PRESSURE: 122 MMHG | DIASTOLIC BLOOD PRESSURE: 73 MMHG | HEART RATE: 91 BPM

## 2023-07-30 LAB
AMPHETAMINES UR QL SCN: NORMAL
BARBITURATES UR QL SCN: NORMAL
BENZODIAZ UR QL SCN: NORMAL
BZE UR QL SCN: NORMAL
CANNABINOIDS UR QL SCN: NORMAL
OPIATES UR QL SCN: NORMAL
PCP QUAL URINE (ROCHE): NORMAL

## 2023-07-30 PROCEDURE — 80307 DRUG TEST PRSMV CHEM ANLYZR: CPT | Performed by: EMERGENCY MEDICINE

## 2023-07-30 PROCEDURE — G0378 HOSPITAL OBSERVATION PER HR: HCPCS

## 2023-07-30 PROCEDURE — 99239 HOSP IP/OBS DSCHRG MGMT >30: CPT | Performed by: NURSE PRACTITIONER

## 2023-07-30 PROCEDURE — 250N000013 HC RX MED GY IP 250 OP 250 PS 637: Performed by: NURSE PRACTITIONER

## 2023-07-30 RX ORDER — HYDROXYZINE PAMOATE 50 MG/1
50 CAPSULE ORAL 4 TIMES DAILY PRN
Qty: 30 CAPSULE | Refills: 0 | Status: SHIPPED | OUTPATIENT
Start: 2023-07-30

## 2023-07-30 RX ORDER — ESCITALOPRAM OXALATE 10 MG/1
10 TABLET ORAL DAILY
Qty: 30 TABLET | Refills: 0 | Status: SHIPPED | OUTPATIENT
Start: 2023-07-30

## 2023-07-30 RX ADMIN — ESCITALOPRAM OXALATE 10 MG: 10 TABLET ORAL at 08:44

## 2023-07-30 RX ADMIN — HYDROXYZINE HYDROCHLORIDE 25 MG: 25 TABLET ORAL at 04:18

## 2023-07-30 RX ADMIN — HYDROXYZINE HYDROCHLORIDE 50 MG: 50 TABLET, FILM COATED ORAL at 11:58

## 2023-07-30 ASSESSMENT — ACTIVITIES OF DAILY LIVING (ADL)
ADLS_ACUITY_SCORE: 35

## 2023-07-30 ASSESSMENT — COLUMBIA-SUICIDE SEVERITY RATING SCALE - C-SSRS
SUICIDE, SINCE LAST CONTACT: NO
ATTEMPT SINCE LAST CONTACT: NO
TOTAL  NUMBER OF INTERRUPTED ATTEMPTS SINCE LAST CONTACT: NO
1. SINCE LAST CONTACT, HAVE YOU WISHED YOU WERE DEAD OR WISHED YOU COULD GO TO SLEEP AND NOT WAKE UP?: NO
6. HAVE YOU EVER DONE ANYTHING, STARTED TO DO ANYTHING, OR PREPARED TO DO ANYTHING TO END YOUR LIFE?: NO
TOTAL  NUMBER OF ABORTED OR SELF INTERRUPTED ATTEMPTS SINCE LAST CONTACT: NO

## 2023-07-30 NOTE — PROGRESS NOTES
Pt VSS. Pt slept throughout the afternoon and evening. Pt reported to writer that she felt really anxious. PRN Atarax administered-see MAR. Pt has tremor in hands when awake. Writer ordered pt food, but pt didn't eat. Pt continues to sleep in recliner. No signs of distress.

## 2023-07-30 NOTE — PROGRESS NOTES
"Triage & Transition Services EmPATH     Progress Note    Patient: Seven goes by \"Seven,\" uses she/her pronouns  Date of Service: July 30, 2023  Site of Service:   Patient was seen in-person.     Presenting problem:  Please see initial DEC/[facility] Crisis Assessment completed by Augusta Garcia on 7/29/23 for complete assessment information. Notable concerns include; pt presented yesterday for attempted suicide by overdose.  Pt felt the zoloft she was prescribed by her OB was not helpful.   Multiple stressors at home.    Individuals Present: Seven & Zainab Vargas [facility]    Session start: 11:55am  Session end: 12:40pm  Session duration in minutes: 45minutes  CPT utilized: 36428 - Psychotherapy (with patient) - 45 (38-52*) min    Current Presentation:   Pt reports she is feeling a little bit better today, she just took a prn medication as she had just gotten off of the phone with her  and is feeling anxious about being here and away from her family.  Pt reports that she feels more rested than before, she was able to get some sleep last night.  She reports that she is calmer today and is feeling better about her new medications.  We discussed her changing her work hours from doing overnights to switching to day/evening shift.  We discussed her taking some time off of work after she discharges from the hospital today.  She is pausing school for the time being and is considering starting back up second semester this year.      Additional Collateral Information:  None at this time.     Mental Status Exam     Affect: Appropriate   Appearance: Appropriate    Attention Span/Concentration: Attentive  Eye Contact: Engaged   Fund of Knowledge: Appropriate    Language /Speech Content: Fluent   Language /Speech Volume: Normal    Language /Speech Rate/Productions: Articulate    Recent Memory: Intact   Remote Memory: Intact   Mood: Depressed    Orientation to Person: Yes    Orientation to Place: Yes   Orientation to Time of Day: " Yes    Orientation to Date: Yes    Situation (Do they understand why they are here?): Yes    Psychomotor Behavior: Normal    Thought Content: Clear   Thought Form: Goal Directed and Intact     [place] Suicide Severity Rating Scale Since Last Contact: 7/30/23  Suicidal Ideation (Since Last Contact)  1. Wish to be Dead (Since Last Contact): No  Suicidal Behavior (Since Last Contact)  Actual Attempt (Since Last Contact): No  Has subject engaged in non-suicidal self-injurious behavior? (Since Last Contact): No  Interrupted Attempts (Since Last Contact): No  Aborted or Self-Interrupted Attempt (Since Last Contact): No  Preparatory Acts or Behavior (Since Last Contact): No  Suicide (Since Last Contact): No     C-SSRS Risk (Since Last Contact)  Calculated C-SSRS Risk Score (Since Last Contact): No Risk Indicated       Diagnosis:          Patient Active Problem List   Diagnosis Code    Gestational hypertension, third trimester O13.3    Gestational hypertension O13.9    Normal delivery O80      F 43.2 Adjustment disorder, unspecified      Therapeutic Intervention(s):   Provided active listening, unconditional positive regard, and validation. Engaged in safety planning.  Identified and practiced coping skills.    Treatment Objective(s) Addressed:   The focus of this session was on rapport building, identifying and practicing coping strategies, safety planning, and identifying an appropriate aftercare plan .     Progress Towards Goals:   Patient reports improving symptoms. Patient is making progress towards treatment goals as evidenced by self report.     Case Management:   None.    Plan and Clinical Summary and Substantiation of Recommendations:   Discharge: Pt is not an imminent threat/danger to herself or others.  She denies any SI, HI, VH, AH or paranoia.  She is agreeable to outpatient follow up and appointments have been scheduled for therapy and medication management.      Attending concurs with disposition: Yes          Zainab Vargas, [facility]     Aftercare Plan    Appointment/s:    If I am feeling unsafe or I am in a crisis, I will:   Contact my established care providers   Call the National Suicide Prevention Lifeline: 479.375.6373   Go to the nearest emergency room   Call 911     Warning signs that I or other people might notice when a crisis is developing for me: Isolation, not following my schedule, don't answer my phone.    Things I am able to do on my own to cope or help me feel better: Practice mindfulness, using technique we practiced in group earlier today, breathing, exercise, watch some videos on youtube.     Things that I am able to do with others to cope or help me better: Talking, be honest, have someone just sit with me.       Things I can use or do for distraction: My phone, watch something, calm/headspace.     Changes I can make to support my mental health and wellness: Take my medication as prescribed, practice mindfulness.     People in my life that I can ask for help: Sisters, .     Your [facility] has a mental health crisis team you can call 24/7: [facility] Crisis Line Number: [phone]      Additional resources:     Crisis Lines  Crisis Text Line  Text 927837  You will be connected with a trained live crisis counselor to provide support.    Gambling Hotline  1.142.981.hope [4673]    National Hope Line  1.800.SUICIDE [3851131]    National Suicide Prevention Lifeline  Free and confidential support  1.588.140.TALK [7223]  http://suicidepreventionlifeline.org    The Aquiles Project (LGBTQ Youth Crisis Line)  2.238.304.7415  text START to 898-020    's Crisis Line  0.689.039.3051 (Press 1)  or text 683736    Community Resources  Fast Tracker  Linking people to mental health and substance use disorder resources  [employer]INDOMn.org     [place] Mental Health Warm Line  Peer to peer support  Monday thru Saturday, 12 pm to 10 pm  240.631.4644 or 1.445.813.8574  Text  "\"Support\" to 30654    National [place] on Mental Illness (JERROD)  528.163.7563 or 1.358.JERROD.HELPS    Walk-in Counseling Center  Free mental health counseling  [facility] [place] [phone]      Additional information:  Today you were seen by a licensed mental health professional through Triage and Transition services, [facility]iders (P)  for a crisis assessment in the Emergency Department at [facility].  It is recommended that you follow up with your established providers (psychiatrist, mental health therapist, and/or primary care doctor - as relevant) as soon as possible. Coordinators from [facility] will be calling you in the next 24-48 hours to ensure that you have the resources you need.  You can also contact [facility] coordinators directly at [phone]. You may have been scheduled for or offered an appointment with a mental health provider. [facility] maintains an extensive network of licensed behavioral health providers to connect patients with the services they need.  We do not charge providers a fee to participate in our referral network.  We match patients with providers based on a patient's specific needs, insurance coverage, and location.  Our first effort will be to refer you to a provider within your care system, and will utilize providers outside your care system as needed.               "

## 2023-07-30 NOTE — PROGRESS NOTES
Pt was observed doing a puzzle, watching television and socializing with a peer throughout the morning/afternoon. She has been intermittently anxious with blunted affect.  Pt denies having much of an appetite today- she ate breakfast, declined to order lunch when offered.  Pt was given PRN hydroxyzine for anxiety; she felt this was very helpful for her symptoms and her affect was visibly brighter on reassessment.

## 2023-07-30 NOTE — PROGRESS NOTES
Discharge instructions reviewed with patient including follow-up care plan. Educated on medication regimen and advised not to stop prescribed medication without consulting their physician. Reviewed safety plan and outpatient resources. Denies SI. All belongings which were brought into the hospital have been returned to patient. Escorted off the unit at 3:09pm accompanied by nurse.  Discharged to home in stable condition with sister via personal vehicle..

## 2023-07-30 NOTE — DISCHARGE INSTRUCTIONS
"Nadia Newell DNP, CNP,RN   [employer]  [address] [phone] 8/3/2023 9:00 am   Medication Mgmt - Initial (In-Person)    Patient Instructions    Greetings! Upon receipt of a referral from [facility], our scheduling department will call and text you to confirm the appointment. Once the appointment is confirmed, we will send you intake forms to your email of which need to be completed upon receipt to keep the scheduled appointment. If you do not have an email, we will encourage you to schedule your appointment with us in person. We look forward to working with you! [email or web address]      Tamika Schrader   The [address], [address] [phone] 8/10/2023 11:00 am   Therapy - Initial (In-Person)    Patient Instructions    You've been scheduled for an appointment at [facility] through [facility]. Please watch your EMAIL inbox/junk folder for appointment confirmation, including our CLIENT PAPERWORK. Paperwork URL: [email or web address] Paperwork must be completed prior to the appointment. Appointments will be rescheduled if paperwork is not received in advance. If you have questions or need to cancel or reschedule I can be reached at --\"[email or web address]    [facility] - Form    Aftercare Plan    Appointment/s:    If I am feeling unsafe or I am in a crisis, I will:   Contact my established care providers   Call the National Suicide Prevention Lifeline: 943.709.5268   Go to the nearest emergency room   Call 301     Warning signs that I or other people might notice when a crisis is developing for me: Isolation, not following my schedule, don't answer my phone.    Things I am able to do on my own to cope or help me feel better: Practice mindfulness, using technique we practiced in group earlier today, breathing, exercise, watch some " "videos on youtube.     Things that I am able to do with others to cope or help me better: Talking, be honest, have someone just sit with me.       Things I can use or do for distraction: My phone, watch something, calm/headspace.     Changes I can make to support my mental health and wellness: Take my medication as prescribed, practice mindfulness.     People in my life that I can ask for help: Sisters, .     Your [facility] has a mental health crisis team you can call 24/7: [facility] Crisis Line Number: [phone]      Additional resources:     Crisis Lines  Crisis Text Line  Text 401890  You will be connected with a trained live crisis counselor to provide support.    Gambling Hotline  1.731.333.hope [4673]    National Hope Line  1.800.SUICIDE [5163207]    National Suicide Prevention Lifeline  Free and confidential support  1.228.912.TALK [1957]  http://suicidepreventionlifeline.org    The Aquiles Project (LGBTQ Youth Crisis Line)  6.563.854.2392  text START to 547-701    [facility] Crisis Line  6.042.789.2456 (Press 1)  or text 425600    Community Resources  Fast Tracker  Linking people to mental health and substance use disorder resources  fasttrack"[employer]n.org     [place] Mental Health Warm Line  Peer to peer support  Monday thru Saturday, 12 pm to 10 pm  145.743.6729 or 9.303.003.4243  Text \"Support\" to 69889    National [place] on Mental Illness (JERROD)  470.309.5825 or 1.888.JERROD.HELPS    Walk-in Counseling Center  Free mental health counseling  [facility]  [phone]      Additional information:  Today you were seen by a licensed mental health professional through Triage and Transition services, [facility] (B[facility])  for a crisis assessment in the Emergency Department at [facility].  It is recommended that you follow up with your established providers (psychiatrist, mental health therapist, and/or primary care doctor - as " relevant) as soon as possible. Coordinators from [facility] will be calling you in the next 24-48 hours to ensure that you have the resources you need.  You can also contact [facility] coordinators directly at [phone]. You may have been scheduled for or offered an appointment with a mental health provider. [facility] maintains an extensive network of licensed behavioral health providers to connect patients with the services they need.  We do not charge providers a fee to participate in our referral network.  We match patients with providers based on a patient's specific needs, insurance coverage, and location.  Our first effort will be to refer you to a provider within your care system, and will utilize providers outside your care system as needed.

## 2023-07-30 NOTE — PROGRESS NOTES
[facility] Progress Note    Client Name: Seven Brown  Date: July 29, 2023  Service Type:  Group Therapy  Facilitator:[email or web address]@        Response:  Patient did not participate in group.      EFRAIN DC

## 2023-07-30 NOTE — PROGRESS NOTES
"Pt was observed resting quietly in the recliner watching television; cooperative with VS and medications.  She reports sleeping well, and rates her anxiety level of 6/10 this morning.  Denies suicidal ideation today.  Denies nausea, pain, and dizziness.  She states that she is feeling \"ok.\"  Pt was educated on potential side effects of antidepressant medication and advised to drink plenty of fluids, get adequate nutrition, and stand up slowly if any dizziness occurs. Pt verbalized agreement to plan to stay on emPATH for reassessment today.    "

## 2023-07-30 NOTE — ED PROVIDER NOTES
EmPATH Unit - Psychiatric Observation Discharge Summary  [facility] Emergency Department  Discharge Date: 2023    Seven Brown MRN: [id number]   Age: 28 year old YOB: 1995     Brief HPI & Initial ED Course     Chief Complaint   Patient presents with    Suicide Attempt     HPI  HPI  Seven Brown is a 28 year old female with a no past medical history and a past psychiatric history of  depression and anxiety.   She presented to the ED for SI s/p suicide attempt by overdose of #50 naprosyn tablets in the context of increased depression and anxiety and increase stressors at home.  She reports her depression began  with the birth of her first son who is currently 15 months old.  She had a miscarriage in 2023.  Since her miscarriage she has been depressed and starting drinking wine more.  Her BAL in the ED was 0.19. She reports she drinks a bottle of wine two night a week. She reports she wants to stop but has not been able to do so. 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  She had a traumatic delivery of her first child and wakes up from NM about the birth trauma. She has been prescribed sertraline 50 mg daily but does not find it helpful.  She started taking it in April.  She would like to try a different medication for depression and anxiety.  She rates depression and anxiety 9/10 and a scale of 0-10 with 10 being the worst.  She rates her anger 2/10.  She denies current SI. When she is feeling very anxious she will think she hears her baby crying but she knows he is asleep.  Otherwise, she denies AH/VH. She has diarrhea when she is very anxious.  She reports she feels terrible in general all the time.  Her sleep vacillates between over sleeping (16 hours after a night shift), to sleeping only one hour. She was seeing a therapist at [facility].  "     Discussed starting Lexapro for depression and anxiety. She will take 5 mg today and start taking 10 mg tomorrow morning. She has also been prescribed hydroxyzine 25 mg tid prn for anxiety.  The [facility] suggested Mother Baby therapy when she discharges.  She will remain on observation status tonight.       I met patient for follow up assessment. At time of our encounter,patient has spent 30.20 hours in the EmPATH and undergone medication adjustment; discontinuation of Zoloft and initiation of Lexapro. On meeting with the patient,she presents wih a bright affect and denies any acute concerns. She reports she had a good night's sleep and woke up with only mild anxiety of 2/10 on a scale with 10 being the worse this morning. She reports is tolerating Lexapro and prn Hydroxyzine has been effective in controlling her anxiety. She request to be discharge home to her spouse, baby and cat, \"I think I am in a better head space now\" She verbalized plan to  continue her prescribed medications, follow up with outpatient services, go back to therapy, and to utilize Mother Baby therapy services through [facility]. Patient does not present disorganized or psychiotic, she denies suicidal or homicidal ideations. She will be discharge home per her request. No medication changes were made today.                Physical Examination   BP: 122/73  Pulse: 91  Temp: 98.2  F (36.8  C)  Resp: 18  Height: 172.7 cm (5' 8\")  Weight: 100.3 kg (221 lb 1.6 oz)  SpO2: 100 %    Physical Exam  General: Appears stated age.   Neuro: Alert and fully oriented. Extremities appear to demonstrate normal strength on visual inspection.   Integumentary/Skin: no rash visualized, normal color    Psychiatric Examination   Appearance: awake, alert and adequately groomed  Attitude:  cooperative  Eye Contact:  fair, better  Mood:  better and good  Affect:  appropriate and in normal range and mood congruent  Speech:  clear, coherent  Psychomotor Behavior:  no evidence of " tardive dyskinesia, dystonia, or tics and intact station, gait and muscle tone  Thought Process:  logical, linear, and goal oriented  Associations:  no loose associations  Thought Content:  no evidence of suicidal ideation or homicidal ideation, no auditory hallucinations present, and no visual hallucinations present  Insight:  fair  Judgement:  fair  Oriented to:  time, person, and place  Attention Span and Concentration:  intact  Recent and Remote Memory:  intact  Language: able to name/identify objects without impairment  Fund of Knowledge: intact with awareness of current and past events    Results     ED Course as of 07/30/23 1326   Sat Jul 29, 2023   0739 Poison Control Center: can cause nausea/vomiting.  Can cause met acidosis and CLAIRE.  Recheck BMP at 10:30am.  If stable, can medically clear.   1221 I rechecked the patient.  She has a very flat affect, continues to have a mild degree of tachycardia, though improved from prior.  She declines snacks brought by her sisters.  I did brief discussion with sisters again, who noted that the patient does have a lot of outpatient resources.  I really iterated the plan to have DEC evaluate the patient.  Will order another IV bolus of fluids, given persistent tachycardia.   1414 I spoke to DEC , Augusta Garcia.  Pt is experiencing PTSD symptoms after multiple losses, birth trauma, miscarriage.  Pt amenable to evaluation in EmPATH unit.       Labs Ordered and Resulted from Time of ED Arrival to Time of ED Departure   COMPREHENSIVE METABOLIC PANEL - Abnormal       Result Value    Sodium 143      Potassium 4.2      Chloride 107      Carbon Dioxide (CO2) 20 (*)     Anion Gap 16 (*)     Urea Nitrogen 10.3      Creatinine 0.63      Calcium 8.6      Glucose 90      Alkaline Phosphatase 76      AST 29      ALT 20      Protein Total 7.2      Albumin 4.5      Bilirubin Total 0.2      GFR Estimate >90     ETHYL ALCOHOL LEVEL - Abnormal    Alcohol ethyl 0.19 (*)    ACETAMINOPHEN  LEVEL - Abnormal    Acetaminophen <5.0 (*)    BASIC METABOLIC PANEL - Abnormal    Sodium 141      Potassium 3.9      Chloride 108 (*)     Carbon Dioxide (CO2) 20 (*)     Anion Gap 13      Urea Nitrogen 10.3      Creatinine 0.56      Calcium 7.7 (*)     Glucose 84      GFR Estimate >90     SALICYLATE LEVEL - Normal    Salicylate <0.3     HCG QUALITATIVE PREGNANCY - Normal    hCG Serum Qualitative Negative     DRUG ABUSE SCREEN 77 URINE (FL, RH, SH) - Normal    Amphetamines Urine Screen Negative      Barbituates Urine Screen Negative      Benzodiazepine Urine Screen Negative      Cannabinoids Urine Screen Negative      Opiates Urine Screen Negative      PCP Urine Screen Negative      Cocaine Urine Screen Negative     CBC WITH PLATELETS AND DIFFERENTIAL    WBC Count 6.2      RBC Count 4.71      Hemoglobin 13.8      Hematocrit 40.7      MCV 86      MCH 29.3      MCHC 33.9      RDW 13.5      Platelet Count 349      % Neutrophils 37      % Lymphocytes 50      % Monocytes 10      % Eosinophils 2      % Basophils 1      % Immature Granulocytes 0      NRBCs per 100 WBC 0      Absolute Neutrophils 2.3      Absolute Lymphocytes 3.2      Absolute Monocytes 0.6      Absolute Eosinophils 0.1      Absolute Basophils 0.1      Absolute Immature Granulocytes 0.0      Absolute NRBCs 0.0         Observation Course   The patient was found to have a psychiatric condition that would benefit from an observation stay in the emergency department for further psychiatric stabilization and/or coordination of a safe disposition. The plan upon observation admission included serial assessments of psychiatric condition, potential administration of medications if indicated, further disposition pending the patient's psychiatric course during the monitoring period.     Serial assessments of the patient's psychiatric condition were performed. Nursing notes were reviewed. During the observation period, the patient did not require medications for  agitation, and did not require restraints/seclusion for patient and/or provider safety.     After a period of working with the treatment team on the EmPATH unit, the patient's mental state improved to allow a safe transition to outpatient care. After counseling on the diagnosis, work-up, and treatment plan, the patient was discharged. Close follow-up with a psychiatrist and/or therapist was recommended and community psychiatric resources were provided. Patient is to return to the ED if any urgent or potentially life-threatening concerns.     Discharge Diagnoses:   Final diagnoses:   Overdose, intentional self-harm, initial encounter (H)   Suicidal ideation   Anxiety   Major depressive disorder, recurrent episode with peripartum onset (H)       Treatment Plan:  - Discharge Home, sister to provide transportation  -Continue Lexapro 10 mg daily, continue prn Hydroxyzine 50 mg every 6 hours.  - Provided education to patient regarding time needed to achieve a therapeutic response to Lexapro and need to utilize prn Hydroxyzine  to manage sleep and anxiety in the interim  -Referral for individual psychotherapy  -Referral for outpatient psychiatry for medication management         At the time of discharge, the patient's acute suicide risk was determined to be low due to the following factors: Reduction in the intensity of mood/anxiety symptoms that preceded the admission, denial of suicidal thoughts, denies feeling helpless or helpless, not currently under the influence of alcohol or illicit substances, denies experiencing command hallucinations, no immediate access to firearms. The patient's acute risk could be higher if noncompliant with their treatment plan, medications, follow-up appointments or using illicit substances or alcohol. Protective factors include: social supports, children, stable housing, employment, Druze beliefs, school.    I spent more than 30 minutes on discharge day activities.    --  Jamila FERNANDEZ  SARAY Vasquez CNP  [facility] EMERGENCY DEPT  EmPATH Unit       Jamila Vasquez, APRN CNP  07/30/23 2655

## 2023-07-30 NOTE — PROGRESS NOTES
[facility] Group Progress Note    Client Name: Seven Brown  Date: July 30, 2023  Service Type:  Group Therapy  Session Start Time:  10:30am  Session End Time: 11:00am  Session Length: 30 minutes  Attendees: Patient and other group members  Facilitator:[email or web address]@     Topic:   Goal setting and mindfulness.    Intervention:    Group process: support, challenge, affirm, psycho-education.     Response:  Patient did participate in group. Behavior in group was calm and cooperative. Patient shared feeling better.  Her goal today is to reduce her anxiety.       RASTA DawkinsSW

## 2024-09-29 ENCOUNTER — HEALTH MAINTENANCE LETTER (OUTPATIENT)
Age: 29
End: 2024-09-29